# Patient Record
Sex: MALE | Race: WHITE | Employment: OTHER | URBAN - METROPOLITAN AREA
[De-identification: names, ages, dates, MRNs, and addresses within clinical notes are randomized per-mention and may not be internally consistent; named-entity substitution may affect disease eponyms.]

---

## 2020-01-21 PROBLEM — I82.4Z2 ACUTE DEEP VEIN THROMBOSIS (DVT) OF DISTAL VEIN OF LEFT LOWER EXTREMITY (HCC): Status: ACTIVE | Noted: 2020-01-21

## 2020-01-21 PROBLEM — D75.82 HIT (HEPARIN-INDUCED THROMBOCYTOPENIA) (HCC): Status: ACTIVE | Noted: 2020-01-21

## 2020-01-21 PROBLEM — D75.829 HIT (HEPARIN-INDUCED THROMBOCYTOPENIA) (HCC): Status: ACTIVE | Noted: 2020-01-21

## 2020-01-22 ENCOUNTER — LAB REQUISITION (OUTPATIENT)
Dept: LAB | Facility: HOSPITAL | Age: 70
End: 2020-01-22
Payer: MEDICARE

## 2020-01-22 DIAGNOSIS — R35.89 OTHER POLYURIA: ICD-10-CM

## 2020-01-22 PROCEDURE — 87088 URINE BACTERIA CULTURE: CPT | Performed by: FAMILY MEDICINE

## 2020-01-22 PROCEDURE — 87086 URINE CULTURE/COLONY COUNT: CPT | Performed by: FAMILY MEDICINE

## 2020-01-22 PROCEDURE — 87186 SC STD MICRODIL/AGAR DIL: CPT | Performed by: FAMILY MEDICINE

## 2020-02-13 PROBLEM — Z79.01 MONITORING FOR LONG-TERM ANTICOAGULANT USE: Status: ACTIVE | Noted: 2020-02-13

## 2020-02-13 PROBLEM — Z51.81 MONITORING FOR LONG-TERM ANTICOAGULANT USE: Status: ACTIVE | Noted: 2020-02-13

## 2020-02-20 ENCOUNTER — LAB REQUISITION (OUTPATIENT)
Dept: LAB | Facility: HOSPITAL | Age: 70
End: 2020-02-20
Payer: MEDICARE

## 2020-02-20 DIAGNOSIS — R35.1 NOCTURIA: ICD-10-CM

## 2020-02-20 LAB
ALBUMIN SERPL-MCNC: 3.3 G/DL (ref 3.4–5)
ALBUMIN/GLOB SERPL: 0.9 {RATIO} (ref 1–2)
ALP LIVER SERPL-CCNC: 65 U/L (ref 45–117)
ALT SERPL-CCNC: 18 U/L (ref 16–61)
ANION GAP SERPL CALC-SCNC: 8 MMOL/L (ref 0–18)
AST SERPL-CCNC: 14 U/L (ref 15–37)
BILIRUB SERPL-MCNC: 0.4 MG/DL (ref 0.1–2)
BILIRUB UR QL: NEGATIVE
BUN BLD-MCNC: 15 MG/DL (ref 7–18)
BUN/CREAT SERPL: 15.8 (ref 10–20)
CALCIUM BLD-MCNC: 8.9 MG/DL (ref 8.5–10.1)
CHLORIDE SERPL-SCNC: 109 MMOL/L (ref 98–112)
CO2 SERPL-SCNC: 25 MMOL/L (ref 21–32)
COLOR UR: YELLOW
COMPLEXED PSA SERPL-MCNC: 1.89 NG/ML (ref ?–4)
CREAT BLD-MCNC: 0.95 MG/DL (ref 0.7–1.3)
GLOBULIN PLAS-MCNC: 3.8 G/DL (ref 2.8–4.4)
GLUCOSE BLD-MCNC: 87 MG/DL (ref 70–99)
GLUCOSE UR-MCNC: NEGATIVE MG/DL
KETONES UR-MCNC: NEGATIVE MG/DL
M PROTEIN MFR SERPL ELPH: 7.1 G/DL (ref 6.4–8.2)
NITRITE UR QL STRIP.AUTO: NEGATIVE
OSMOLALITY SERPL CALC.SUM OF ELEC: 294 MOSM/KG (ref 275–295)
PH UR: 5 [PH] (ref 5–8)
POTASSIUM SERPL-SCNC: 4.2 MMOL/L (ref 3.5–5.1)
PROT UR-MCNC: 30 MG/DL
RBC #/AREA URNS AUTO: 14 /HPF
SODIUM SERPL-SCNC: 142 MMOL/L (ref 136–145)
SP GR UR STRIP: 1.02 (ref 1–1.03)
UROBILINOGEN UR STRIP-ACNC: <2
WBC #/AREA URNS AUTO: 708 /HPF

## 2020-02-20 PROCEDURE — 87077 CULTURE AEROBIC IDENTIFY: CPT | Performed by: FAMILY MEDICINE

## 2020-02-20 PROCEDURE — 87086 URINE CULTURE/COLONY COUNT: CPT | Performed by: FAMILY MEDICINE

## 2020-02-20 PROCEDURE — 80053 COMPREHEN METABOLIC PANEL: CPT | Performed by: FAMILY MEDICINE

## 2020-02-20 PROCEDURE — 81001 URINALYSIS AUTO W/SCOPE: CPT | Performed by: FAMILY MEDICINE

## 2020-02-20 PROCEDURE — 87186 SC STD MICRODIL/AGAR DIL: CPT | Performed by: FAMILY MEDICINE

## 2020-03-12 ENCOUNTER — LAB REQUISITION (OUTPATIENT)
Dept: LAB | Facility: HOSPITAL | Age: 70
End: 2020-03-12
Payer: MEDICARE

## 2020-03-12 DIAGNOSIS — N39.0 URINARY TRACT INFECTION, SITE NOT SPECIFIED: ICD-10-CM

## 2020-03-12 LAB
BACTERIA UR QL AUTO: NEGATIVE /HPF
BILIRUB UR QL: NEGATIVE
COLOR UR: YELLOW
GLUCOSE UR-MCNC: NEGATIVE MG/DL
KETONES UR-MCNC: NEGATIVE MG/DL
NITRITE UR QL STRIP.AUTO: NEGATIVE
PH UR: 5 [PH] (ref 5–8)
PROT UR-MCNC: 30 MG/DL
RBC #/AREA URNS AUTO: 53 /HPF
SP GR UR STRIP: 1.01 (ref 1–1.03)
UROBILINOGEN UR STRIP-ACNC: <2
WBC #/AREA URNS AUTO: 750 /HPF

## 2020-03-12 PROCEDURE — 81001 URINALYSIS AUTO W/SCOPE: CPT | Performed by: FAMILY MEDICINE

## 2020-03-12 PROCEDURE — 87086 URINE CULTURE/COLONY COUNT: CPT | Performed by: FAMILY MEDICINE

## 2020-03-12 PROCEDURE — 87186 SC STD MICRODIL/AGAR DIL: CPT | Performed by: FAMILY MEDICINE

## 2020-03-12 PROCEDURE — 87088 URINE BACTERIA CULTURE: CPT | Performed by: FAMILY MEDICINE

## 2020-08-19 ENCOUNTER — LAB REQUISITION (OUTPATIENT)
Dept: LAB | Facility: HOSPITAL | Age: 70
End: 2020-08-19
Payer: MEDICARE

## 2020-08-19 DIAGNOSIS — C44.41 BASAL CELL CARCINOMA OF SKIN OF SCALP AND NECK: ICD-10-CM

## 2020-08-19 PROCEDURE — 88305 TISSUE EXAM BY PATHOLOGIST: CPT | Performed by: DERMATOLOGY

## 2020-08-27 PROBLEM — E78.00 HYPERCHOLESTEROLEMIA: Status: ACTIVE | Noted: 2020-08-27

## 2020-08-27 PROBLEM — I10 HYPERTENSION, ESSENTIAL: Status: ACTIVE | Noted: 2020-08-27

## 2020-09-14 ENCOUNTER — LAB ENCOUNTER (OUTPATIENT)
Dept: LAB | Age: 70
DRG: 253 | End: 2020-09-14
Attending: SURGERY
Payer: MEDICARE

## 2020-09-14 ENCOUNTER — APPOINTMENT (OUTPATIENT)
Dept: LAB | Age: 70
DRG: 253 | End: 2020-09-14
Attending: SURGERY
Payer: MEDICARE

## 2020-09-14 DIAGNOSIS — Z01.818 PRE-OP TESTING: ICD-10-CM

## 2020-09-14 LAB
ANTIBODY SCREEN: NEGATIVE
RH BLOOD TYPE: POSITIVE

## 2020-09-14 PROCEDURE — 86900 BLOOD TYPING SEROLOGIC ABO: CPT

## 2020-09-14 PROCEDURE — 87641 MR-STAPH DNA AMP PROBE: CPT

## 2020-09-14 PROCEDURE — 86850 RBC ANTIBODY SCREEN: CPT

## 2020-09-14 PROCEDURE — 36415 COLL VENOUS BLD VENIPUNCTURE: CPT

## 2020-09-14 PROCEDURE — 86901 BLOOD TYPING SEROLOGIC RH(D): CPT

## 2020-09-15 LAB
MRSA DNA SPEC QL NAA+PROBE: NEGATIVE
SARS-COV-2 RNA RESP QL NAA+PROBE: NOT DETECTED

## 2020-09-16 ENCOUNTER — ANESTHESIA (OUTPATIENT)
Dept: SURGERY | Facility: HOSPITAL | Age: 70
DRG: 253 | End: 2020-09-16
Payer: MEDICARE

## 2020-09-16 ENCOUNTER — ANESTHESIA EVENT (OUTPATIENT)
Dept: SURGERY | Facility: HOSPITAL | Age: 70
DRG: 253 | End: 2020-09-16
Payer: MEDICARE

## 2020-09-16 ENCOUNTER — HOSPITAL ENCOUNTER (INPATIENT)
Facility: HOSPITAL | Age: 70
LOS: 6 days | Discharge: HOME | DRG: 253 | End: 2020-09-22
Attending: SURGERY | Admitting: SURGERY
Payer: MEDICARE

## 2020-09-16 DIAGNOSIS — Z01.818 PRE-OP TESTING: Primary | ICD-10-CM

## 2020-09-16 PROBLEM — E27.40 ADRENAL INSUFFICIENCY (HCC): Status: ACTIVE | Noted: 2020-09-16

## 2020-09-16 PROBLEM — I72.4 POPLITEAL ARTERY ANEURYSM (HCC): Status: ACTIVE | Noted: 2020-09-16

## 2020-09-16 PROBLEM — R31.9 HEMATURIA: Status: ACTIVE | Noted: 2020-09-16

## 2020-09-16 LAB
BILIRUB UR QL: NEGATIVE
COLOR UR: YELLOW
GLUCOSE UR-MCNC: NEGATIVE MG/DL
INR BLD: 1.11 (ref 0.9–1.2)
ISTAT ACTIVATED CLOTTING TIME: 153 SECONDS (ref 125–137)
ISTAT ACTIVATED CLOTTING TIME: 521 SECONDS (ref 125–137)
ISTAT ACTIVATED CLOTTING TIME: <125 SECONDS (ref 125–137)
ISTAT ACTIVATED CLOTTING TIME: >675 SECONDS (ref 125–137)
KETONES UR-MCNC: NEGATIVE MG/DL
NITRITE UR QL STRIP.AUTO: NEGATIVE
PH UR: 5 [PH] (ref 5–8)
POCT HEMOCUE: 13.3 (ref 13.5–17.5)
PROT UR-MCNC: 30 MG/DL
PROTHROMBIN TIME: 14.1 SECONDS (ref 11.8–14.5)
RBC #/AREA URNS AUTO: 1416 /HPF
SP GR UR STRIP: 1.02 (ref 1–1.03)
UROBILINOGEN UR STRIP-ACNC: <2
WBC #/AREA URNS AUTO: 69 /HPF

## 2020-09-16 PROCEDURE — 041M0JL BYPASS RIGHT POPLITEAL ARTERY TO POPLITEAL ARTERY WITH SYNTHETIC SUBSTITUTE, OPEN APPROACH: ICD-10-PCS | Performed by: SURGERY

## 2020-09-16 PROCEDURE — 85610 PROTHROMBIN TIME: CPT | Performed by: SURGERY

## 2020-09-16 PROCEDURE — 85347 COAGULATION TIME ACTIVATED: CPT

## 2020-09-16 PROCEDURE — 87086 URINE CULTURE/COLONY COUNT: CPT | Performed by: NURSE PRACTITIONER

## 2020-09-16 PROCEDURE — 36415 COLL VENOUS BLD VENIPUNCTURE: CPT | Performed by: SURGERY

## 2020-09-16 PROCEDURE — 81001 URINALYSIS AUTO W/SCOPE: CPT | Performed by: NURSE PRACTITIONER

## 2020-09-16 RX ORDER — MORPHINE SULFATE 4 MG/ML
2 INJECTION, SOLUTION INTRAMUSCULAR; INTRAVENOUS EVERY 10 MIN PRN
Status: DISCONTINUED | OUTPATIENT
Start: 2020-09-16 | End: 2020-09-16 | Stop reason: HOSPADM

## 2020-09-16 RX ORDER — GABAPENTIN 300 MG/1
300 CAPSULE ORAL 3 TIMES DAILY
Status: DISCONTINUED | OUTPATIENT
Start: 2020-09-16 | End: 2020-09-22

## 2020-09-16 RX ORDER — FAMOTIDINE 20 MG/1
20 TABLET ORAL ONCE
Status: COMPLETED | OUTPATIENT
Start: 2020-09-16 | End: 2020-09-16

## 2020-09-16 RX ORDER — MORPHINE SULFATE 4 MG/ML
4 INJECTION, SOLUTION INTRAMUSCULAR; INTRAVENOUS EVERY 10 MIN PRN
Status: DISCONTINUED | OUTPATIENT
Start: 2020-09-16 | End: 2020-09-16 | Stop reason: HOSPADM

## 2020-09-16 RX ORDER — HYDROCODONE BITARTRATE AND ACETAMINOPHEN 5; 325 MG/1; MG/1
1 TABLET ORAL EVERY 6 HOURS PRN
Status: DISCONTINUED | OUTPATIENT
Start: 2020-09-16 | End: 2020-09-22

## 2020-09-16 RX ORDER — TRAMADOL HYDROCHLORIDE 50 MG/1
50 TABLET ORAL EVERY 6 HOURS PRN
Status: DISCONTINUED | OUTPATIENT
Start: 2020-09-16 | End: 2020-09-16

## 2020-09-16 RX ORDER — CEFAZOLIN SODIUM/WATER 2 G/20 ML
2 SYRINGE (ML) INTRAVENOUS ONCE
Status: COMPLETED | OUTPATIENT
Start: 2020-09-16 | End: 2020-09-16

## 2020-09-16 RX ORDER — DEXTROSE AND SODIUM CHLORIDE 5; .45 G/100ML; G/100ML
INJECTION, SOLUTION INTRAVENOUS CONTINUOUS
Status: DISCONTINUED | OUTPATIENT
Start: 2020-09-16 | End: 2020-09-17

## 2020-09-16 RX ORDER — MORPHINE SULFATE 10 MG/ML
6 INJECTION, SOLUTION INTRAMUSCULAR; INTRAVENOUS EVERY 10 MIN PRN
Status: DISCONTINUED | OUTPATIENT
Start: 2020-09-16 | End: 2020-09-16 | Stop reason: HOSPADM

## 2020-09-16 RX ORDER — METOPROLOL TARTRATE 5 MG/5ML
INJECTION INTRAVENOUS AS NEEDED
Status: DISCONTINUED | OUTPATIENT
Start: 2020-09-16 | End: 2020-09-16 | Stop reason: SURG

## 2020-09-16 RX ORDER — HYDROCODONE BITARTRATE AND ACETAMINOPHEN 5; 325 MG/1; MG/1
1 TABLET ORAL AS NEEDED
Status: DISCONTINUED | OUTPATIENT
Start: 2020-09-16 | End: 2020-09-16 | Stop reason: HOSPADM

## 2020-09-16 RX ORDER — NALOXONE HYDROCHLORIDE 0.4 MG/ML
80 INJECTION, SOLUTION INTRAMUSCULAR; INTRAVENOUS; SUBCUTANEOUS AS NEEDED
Status: DISCONTINUED | OUTPATIENT
Start: 2020-09-16 | End: 2020-09-16 | Stop reason: HOSPADM

## 2020-09-16 RX ORDER — CEFAZOLIN SODIUM/WATER 2 G/20 ML
2 SYRINGE (ML) INTRAVENOUS EVERY 8 HOURS
Status: COMPLETED | OUTPATIENT
Start: 2020-09-16 | End: 2020-09-17

## 2020-09-16 RX ORDER — ONDANSETRON 2 MG/ML
INJECTION INTRAMUSCULAR; INTRAVENOUS AS NEEDED
Status: DISCONTINUED | OUTPATIENT
Start: 2020-09-16 | End: 2020-09-16 | Stop reason: SURG

## 2020-09-16 RX ORDER — METOCLOPRAMIDE 10 MG/1
10 TABLET ORAL ONCE
Status: COMPLETED | OUTPATIENT
Start: 2020-09-16 | End: 2020-09-16

## 2020-09-16 RX ORDER — PHENYLEPHRINE HCL 10 MG/ML
VIAL (ML) INJECTION AS NEEDED
Status: DISCONTINUED | OUTPATIENT
Start: 2020-09-16 | End: 2020-09-16 | Stop reason: SURG

## 2020-09-16 RX ORDER — TAMSULOSIN HYDROCHLORIDE 0.4 MG/1
0.4 CAPSULE ORAL EVERY EVENING
Status: DISCONTINUED | OUTPATIENT
Start: 2020-09-16 | End: 2020-09-22

## 2020-09-16 RX ORDER — TRAMADOL HYDROCHLORIDE 50 MG/1
50 TABLET ORAL EVERY 6 HOURS PRN
Status: DISCONTINUED | OUTPATIENT
Start: 2020-09-16 | End: 2020-09-22

## 2020-09-16 RX ORDER — ACETAMINOPHEN 500 MG
1000 TABLET ORAL ONCE
Status: COMPLETED | OUTPATIENT
Start: 2020-09-16 | End: 2020-09-16

## 2020-09-16 RX ORDER — CLOPIDOGREL BISULFATE 75 MG/1
75 TABLET ORAL DAILY
Status: DISCONTINUED | OUTPATIENT
Start: 2020-09-16 | End: 2020-09-16

## 2020-09-16 RX ORDER — ONDANSETRON 2 MG/ML
4 INJECTION INTRAMUSCULAR; INTRAVENOUS EVERY 6 HOURS PRN
Status: DISCONTINUED | OUTPATIENT
Start: 2020-09-16 | End: 2020-09-22

## 2020-09-16 RX ORDER — ONDANSETRON 2 MG/ML
4 INJECTION INTRAMUSCULAR; INTRAVENOUS ONCE AS NEEDED
Status: DISCONTINUED | OUTPATIENT
Start: 2020-09-16 | End: 2020-09-16 | Stop reason: HOSPADM

## 2020-09-16 RX ORDER — HYDROMORPHONE HYDROCHLORIDE 1 MG/ML
0.6 INJECTION, SOLUTION INTRAMUSCULAR; INTRAVENOUS; SUBCUTANEOUS EVERY 5 MIN PRN
Status: DISCONTINUED | OUTPATIENT
Start: 2020-09-16 | End: 2020-09-16 | Stop reason: HOSPADM

## 2020-09-16 RX ORDER — LIDOCAINE HYDROCHLORIDE 10 MG/ML
INJECTION, SOLUTION EPIDURAL; INFILTRATION; INTRACAUDAL; PERINEURAL AS NEEDED
Status: DISCONTINUED | OUTPATIENT
Start: 2020-09-16 | End: 2020-09-16 | Stop reason: SURG

## 2020-09-16 RX ORDER — ACETAMINOPHEN 325 MG/1
650 TABLET ORAL EVERY 6 HOURS PRN
Status: DISCONTINUED | OUTPATIENT
Start: 2020-09-16 | End: 2020-09-22

## 2020-09-16 RX ORDER — ESMOLOL HYDROCHLORIDE 10 MG/ML
INJECTION INTRAVENOUS AS NEEDED
Status: DISCONTINUED | OUTPATIENT
Start: 2020-09-16 | End: 2020-09-16 | Stop reason: SURG

## 2020-09-16 RX ORDER — GLYCOPYRROLATE 0.2 MG/ML
INJECTION, SOLUTION INTRAMUSCULAR; INTRAVENOUS AS NEEDED
Status: DISCONTINUED | OUTPATIENT
Start: 2020-09-16 | End: 2020-09-16 | Stop reason: SURG

## 2020-09-16 RX ORDER — ATORVASTATIN CALCIUM 20 MG/1
20 TABLET, FILM COATED ORAL DAILY
Status: DISCONTINUED | OUTPATIENT
Start: 2020-09-17 | End: 2020-09-22

## 2020-09-16 RX ORDER — HYDROMORPHONE HYDROCHLORIDE 1 MG/ML
0.2 INJECTION, SOLUTION INTRAMUSCULAR; INTRAVENOUS; SUBCUTANEOUS EVERY 5 MIN PRN
Status: DISCONTINUED | OUTPATIENT
Start: 2020-09-16 | End: 2020-09-16 | Stop reason: HOSPADM

## 2020-09-16 RX ORDER — SODIUM CHLORIDE 9 MG/ML
INJECTION, SOLUTION INTRAVENOUS CONTINUOUS PRN
Status: DISCONTINUED | OUTPATIENT
Start: 2020-09-16 | End: 2020-09-16 | Stop reason: SURG

## 2020-09-16 RX ORDER — NEOSTIGMINE METHYLSULFATE 1 MG/ML
INJECTION INTRAVENOUS AS NEEDED
Status: DISCONTINUED | OUTPATIENT
Start: 2020-09-16 | End: 2020-09-16 | Stop reason: SURG

## 2020-09-16 RX ORDER — SODIUM CHLORIDE, SODIUM LACTATE, POTASSIUM CHLORIDE, CALCIUM CHLORIDE 600; 310; 30; 20 MG/100ML; MG/100ML; MG/100ML; MG/100ML
INJECTION, SOLUTION INTRAVENOUS CONTINUOUS
Status: DISCONTINUED | OUTPATIENT
Start: 2020-09-16 | End: 2020-09-16 | Stop reason: HOSPADM

## 2020-09-16 RX ORDER — CLOPIDOGREL BISULFATE 75 MG/1
75 TABLET ORAL EVERY EVENING
Status: DISCONTINUED | OUTPATIENT
Start: 2020-09-17 | End: 2020-09-22

## 2020-09-16 RX ORDER — ROCURONIUM BROMIDE 10 MG/ML
INJECTION, SOLUTION INTRAVENOUS AS NEEDED
Status: DISCONTINUED | OUTPATIENT
Start: 2020-09-16 | End: 2020-09-16 | Stop reason: SURG

## 2020-09-16 RX ORDER — HYDROCODONE BITARTRATE AND ACETAMINOPHEN 5; 325 MG/1; MG/1
2 TABLET ORAL AS NEEDED
Status: DISCONTINUED | OUTPATIENT
Start: 2020-09-16 | End: 2020-09-16 | Stop reason: HOSPADM

## 2020-09-16 RX ORDER — SODIUM CHLORIDE, SODIUM LACTATE, POTASSIUM CHLORIDE, CALCIUM CHLORIDE 600; 310; 30; 20 MG/100ML; MG/100ML; MG/100ML; MG/100ML
INJECTION, SOLUTION INTRAVENOUS CONTINUOUS
Status: DISCONTINUED | OUTPATIENT
Start: 2020-09-16 | End: 2020-09-17

## 2020-09-16 RX ORDER — METHYLPREDNISOLONE SODIUM SUCCINATE 125 MG/2ML
50 INJECTION, POWDER, LYOPHILIZED, FOR SOLUTION INTRAMUSCULAR; INTRAVENOUS EVERY 8 HOURS
Status: DISCONTINUED | OUTPATIENT
Start: 2020-09-16 | End: 2020-09-16

## 2020-09-16 RX ORDER — HYDROMORPHONE HYDROCHLORIDE 1 MG/ML
0.4 INJECTION, SOLUTION INTRAMUSCULAR; INTRAVENOUS; SUBCUTANEOUS EVERY 5 MIN PRN
Status: DISCONTINUED | OUTPATIENT
Start: 2020-09-16 | End: 2020-09-16 | Stop reason: HOSPADM

## 2020-09-16 RX ADMIN — SODIUM CHLORIDE: 9 INJECTION, SOLUTION INTRAVENOUS at 14:00:00

## 2020-09-16 RX ADMIN — SODIUM CHLORIDE: 9 INJECTION, SOLUTION INTRAVENOUS at 12:26:00

## 2020-09-16 RX ADMIN — CEFAZOLIN SODIUM/WATER 2 G: 2 G/20 ML SYRINGE (ML) INTRAVENOUS at 10:11:00

## 2020-09-16 RX ADMIN — GLYCOPYRROLATE 0.8 MG: 0.2 INJECTION, SOLUTION INTRAMUSCULAR; INTRAVENOUS at 13:42:00

## 2020-09-16 RX ADMIN — ROCURONIUM BROMIDE 20 MG: 10 INJECTION, SOLUTION INTRAVENOUS at 10:49:00

## 2020-09-16 RX ADMIN — SODIUM CHLORIDE: 9 INJECTION, SOLUTION INTRAVENOUS at 13:43:00

## 2020-09-16 RX ADMIN — CEFAZOLIN SODIUM/WATER 2 G: 2 G/20 ML SYRINGE (ML) INTRAVENOUS at 10:22:00

## 2020-09-16 RX ADMIN — SODIUM CHLORIDE: 9 INJECTION, SOLUTION INTRAVENOUS at 11:20:00

## 2020-09-16 RX ADMIN — METOPROLOL TARTRATE 1 MG: 5 INJECTION INTRAVENOUS at 13:30:00

## 2020-09-16 RX ADMIN — PHENYLEPHRINE HCL 100 MCG: 10 MG/ML VIAL (ML) INJECTION at 10:41:00

## 2020-09-16 RX ADMIN — SODIUM CHLORIDE, SODIUM LACTATE, POTASSIUM CHLORIDE, CALCIUM CHLORIDE: 600; 310; 30; 20 INJECTION, SOLUTION INTRAVENOUS at 11:58:00

## 2020-09-16 RX ADMIN — ROCURONIUM BROMIDE 10 MG: 10 INJECTION, SOLUTION INTRAVENOUS at 11:49:00

## 2020-09-16 RX ADMIN — ESMOLOL HYDROCHLORIDE 20 MG: 10 INJECTION INTRAVENOUS at 10:37:00

## 2020-09-16 RX ADMIN — PHENYLEPHRINE HCL 100 MCG: 10 MG/ML VIAL (ML) INJECTION at 12:57:00

## 2020-09-16 RX ADMIN — SODIUM CHLORIDE, SODIUM LACTATE, POTASSIUM CHLORIDE, CALCIUM CHLORIDE: 600; 310; 30; 20 INJECTION, SOLUTION INTRAVENOUS at 11:20:00

## 2020-09-16 RX ADMIN — METOPROLOL TARTRATE 1 MG: 5 INJECTION INTRAVENOUS at 13:46:00

## 2020-09-16 RX ADMIN — SODIUM CHLORIDE: 9 INJECTION, SOLUTION INTRAVENOUS at 13:44:00

## 2020-09-16 RX ADMIN — SODIUM CHLORIDE: 9 INJECTION, SOLUTION INTRAVENOUS at 12:50:00

## 2020-09-16 RX ADMIN — ROCURONIUM BROMIDE 50 MG: 10 INJECTION, SOLUTION INTRAVENOUS at 10:00:00

## 2020-09-16 RX ADMIN — PHENYLEPHRINE HCL 100 MCG: 10 MG/ML VIAL (ML) INJECTION at 12:41:00

## 2020-09-16 RX ADMIN — SODIUM CHLORIDE: 9 INJECTION, SOLUTION INTRAVENOUS at 13:31:00

## 2020-09-16 RX ADMIN — ONDANSETRON 4 MG: 2 INJECTION INTRAMUSCULAR; INTRAVENOUS at 13:44:00

## 2020-09-16 RX ADMIN — LIDOCAINE HYDROCHLORIDE 50 MG: 10 INJECTION, SOLUTION EPIDURAL; INFILTRATION; INTRACAUDAL; PERINEURAL at 09:59:00

## 2020-09-16 RX ADMIN — ESMOLOL HYDROCHLORIDE 10 MG: 10 INJECTION INTRAVENOUS at 11:27:00

## 2020-09-16 RX ADMIN — NEOSTIGMINE METHYLSULFATE 5 MG: 1 INJECTION INTRAVENOUS at 13:42:00

## 2020-09-16 RX ADMIN — SODIUM CHLORIDE: 9 INJECTION, SOLUTION INTRAVENOUS at 12:25:00

## 2020-09-16 RX ADMIN — ROCURONIUM BROMIDE 10 MG: 10 INJECTION, SOLUTION INTRAVENOUS at 12:19:00

## 2020-09-16 RX ADMIN — SODIUM CHLORIDE: 9 INJECTION, SOLUTION INTRAVENOUS at 10:07:00

## 2020-09-16 RX ADMIN — ESMOLOL HYDROCHLORIDE 20 MG: 10 INJECTION INTRAVENOUS at 12:38:00

## 2020-09-16 RX ADMIN — ESMOLOL HYDROCHLORIDE 20 MG: 10 INJECTION INTRAVENOUS at 10:09:00

## 2020-09-16 NOTE — H&P
[facility] Hospitalist Team  History and Physical  Admit Date:  9/16/20    ASSESSMENT / PLAN:   70 yo male with hx basal cell ca, BPH, HTN, HL, abnl EKG, PAD dry gangrene S/P auto amputation, acute left popliteal artery thrombosis S/P Left SFA-peroneal artery byp plan as detailed above.  Discussed plan of care with Dr. Arpit Alfaro RN, NP  [address] [facility] Team  Pager [phone]  Answering Service number: [phone]  9/16/2020      HISTORY:   CC: Post Op Medical Management     PCP: Medications:  hydrocortisone 10 MG Oral Tab, Take 1 tablet (10 mg total) by mouth nightly., Disp: 90 tablet, Rfl: 0  hydrocortisone 20 MG Oral Tab, Take 1 tablet (20 mg total) by mouth every morning., Disp: 90 tablet, Rfl: 0  Fludrocortisone Acetate 0.1 MG assessed independently. Agree with above APN assessment.      Mr. Karsten Montemayor is a 70 yo male with hx basal cell ca, BPH, HTN, HL, abnl EKG, PAD dry gangrene S/P auto amputation, acute left popliteal artery thrombosis S/P Left SFA-peroneal artery bypass and surgery  - urology consulted, continue hourly dwyer flushing    Rest as above.     Marilyn Dick MD  [facility]

## 2020-09-16 NOTE — CONSULTS
[facility]    Report of Endocrinology Consultation  [facility]    Racquel Hogan Patient Status:  Inpatient    10/2/1950 MRN [id number]   Location [facility] 2W/SW Attending Laly Leyva MD   [facility] Day # 0 reports that he has quit smoking. He has never used smokeless tobacco. He reports that he does not drink alcohol or use drugs.     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 Cooperative. No apparent distress. HEENT: SALEEM, EOMI, thyroid non-enlarged  Neck: Supple. Lungs: Clear bilaterally. Cardiac: Regular rate and rhythm. Abdomen: Bowel sounds present, normoactive. Nontender.   Extremities: Extensive sca

## 2020-09-16 NOTE — H&P
Rigoberto Lau MD.   [facility]  Vascular and Endovascular Surgery  [address] [facility]                    VASCULAR SURGERY   CLINIC CONSULT NOTE           Name: Bebeto Deleon   :   10/2/1950  [id number]      REFERRING PHYSICIAN:  Araceli Michelle hydrocortisone 20 MG Oral Tab, Take 1 tablet (20 mg total) by mouth every morning., Disp: 90 tablet, Rfl: 0  •  Fludrocortisone Acetate 0.1 MG Oral Tab, Take 0.5 tablets (0.05 mg total) by mouth daily. , Disp: 45 tablet, Rfl: 3  •  tamsulosin (FLOMAX) cap, this visit:     Popliteal artery aneurysm (HCC)        The patient ha a 3 cm right popliteal aneurysm. I have recommended repair via a posterior approach. I have recommended expediting this repair given his history of occlusion on the opposite side.   The

## 2020-09-16 NOTE — ANESTHESIA PROCEDURE NOTES
Peripheral IV  Date/Time: 9/16/2020 10:07 AM  Inserted by: Ammon Vargas MD    Placement  Needle size: 18 G  Laterality: left  Location: hand  Site prep: alcohol  Technique: anatomical landmarks  Attempts: 1

## 2020-09-16 NOTE — CONSULTS
[facility]OSP - [facility]    Report of Consultation    Jeni Hogan Patient Status:  Inpatient    10/2/1950 MRN [id number]   Location [facility] 2W/SW Attending Duane Pereyra, MD   Hosp Day # 0 PCP PHYSICIAN NONSTAFF     Date of Admissi Medications:  lactated ringers infusion, , Intravenous, Continuous  [START ON 9/17/2020] atorvastatin (LIPITOR) tab 20 mg, 20 mg, Oral, Daily  [START ON 9/17/2020] Clopidogrel Bisulfate (PLAVIX) tab 75 mg, 75 mg, Oral, QPM  gabapentin (NEURONTIN) cap 300 m m), weight 263 lb (119.3 kg), SpO2 100 %.     GENERAL APPEARANCE: a well-developed, well-nourished, in no apparent distress  A&Ox3  HEENT normocephalic, no icterus  NECK supple, No supraclavicular masses  LUNGS breathing comfortably, not dyspneic, no use of

## 2020-09-16 NOTE — BRIEF OP NOTE
[facility]      PATIENTS NAME: Malena Young  ATTENDING PHYSICIAN: Letty Hoffmann MD  OPERATING PHYSICIAN: Dyllan Alves MD  CSN: [id number]                                                                LOCATION:  OR  MRN: [id number]

## 2020-09-16 NOTE — ANESTHESIA PREPROCEDURE EVALUATION
Anesthesia PreOp Note    HPI:     Babs Granger is a 71year old male who presents for preoperative consultation requested by: Masha Morel MD    Date of Surgery: 9/16/2020    Procedure(s):   FEMORAL POPLITEAL BYPASS  Indication: popliteal artery Sodium 5 MG Oral Tab, Take 1 tablet (5 mg total) by mouth nightly., Disp: 30 tablet, Rfl: 11, 9/11/2020  atorvastatin 20 MG Oral Tab, 20 mg every morning.  , Disp: , Rfl: , 9/15/2020 at 1930  Clopidogrel Bisulfate 75 MG Oral Tab, Take 75 mg by mouth every Never      Sexual activity: Not on file    Lifestyle      Physical activity:        Days per week: Not on file        Minutes per session: Not on file      Stress: Not on file    Relationships      Social connections:        Talks on phone: Not on file anesthetic complications   Airway   Mallampati: II  TM distance: >3 FB  Neck ROM: full  Dental      Comment: Poor dentition, very loose teeth      Pulmonary - normal exam     ROS comment: + history of smoking x 25 years, quit 1993  No current wheezing, not

## 2020-09-16 NOTE — ANESTHESIA PROCEDURE NOTES
Arterial Line  Performed by: Valentín Elizondo MD  Authorized by: Valentín Elizondo MD     General Information and Staff    Procedure Start:  9/16/2020 10:03 AM  Procedure End:  9/16/2020 10:05 AM  Anesthesiologist:  Valentín Elizondo MD  Performed By:

## 2020-09-16 NOTE — ANESTHESIA POSTPROCEDURE EVALUATION
Patient: Rodrick Horta    Procedure Summary     Date:  09/16/20 Room / Location:  [address] [place] [address] MAIN OR 17 / [address] [place] [address] MAIN OR    Anesthesia Start:  3045 Anesthesia Stop:  3230    Procedure:  FEMORAL POPLITEAL BYPASS (Right ) Diagnosis:  (popliteal artery aneurysm)

## 2020-09-16 NOTE — OPERATIVE REPORT
[facility]     PATIENTS NAME: Malena Young  ATTENDING PHYSICIAN: Letty Hoffmann MD  OPERATING PHYSICIAN: Dyllan Alves MD  CSN: [id number]     LOCATION:  OR  MRN: [id number]    YOB: 1950  ADMISSION DATE: 9/16/2020  OPERATION incision was made across the posterior crease of the knee joint, with its superior extent beginning medially. The incision was carried anteriorly through the subcutaneous tissue and superficial fascia to enter the popliteal fossa.  Exposure is maximized by suture line, back-bleeding, forward-flushing, and irrigation of the anastomosis with heparinized solution was performed. The anastomosis was then completed and checked for hemostasis, which was adequate.  The graft was then cut to the appropriate length and

## 2020-09-16 NOTE — ANESTHESIA PROCEDURE NOTES
Airway  Date/Time: 9/16/2020 10:01 AM  Urgency: elective      General Information and Staff    Patient location during procedure: OR  Anesthesiologist: Indira Kraus MD  Performed: anesthesiologist     Indications and Patient Condition  Indications for

## 2020-09-17 LAB
ANION GAP SERPL CALC-SCNC: 9 MMOL/L (ref 0–18)
BASOPHILS # BLD AUTO: 0.03 X10(3) UL (ref 0–0.2)
BASOPHILS NFR BLD AUTO: 0.2 %
BUN BLD-MCNC: 14 MG/DL (ref 7–18)
BUN/CREAT SERPL: 11.3 (ref 10–20)
CALCIUM BLD-MCNC: 8.7 MG/DL (ref 8.5–10.1)
CHLORIDE SERPL-SCNC: 112 MMOL/L (ref 98–112)
CO2 SERPL-SCNC: 20 MMOL/L (ref 21–32)
CREAT BLD-MCNC: 1.24 MG/DL (ref 0.7–1.3)
DEPRECATED RDW RBC AUTO: 51.6 FL (ref 35.1–46.3)
EOSINOPHIL # BLD AUTO: 0 X10(3) UL (ref 0–0.7)
EOSINOPHIL NFR BLD AUTO: 0 %
ERYTHROCYTE [DISTWIDTH] IN BLOOD BY AUTOMATED COUNT: 15.2 % (ref 11–15)
EST. AVERAGE GLUCOSE BLD GHB EST-MCNC: 117 MG/DL (ref 68–126)
GLUCOSE BLD-MCNC: 196 MG/DL (ref 70–99)
HBA1C MFR BLD HPLC: 5.7 % (ref ?–5.7)
HCT VFR BLD AUTO: 32.2 % (ref 39–53)
HGB BLD-MCNC: 10.6 G/DL (ref 13–17.5)
IMM GRANULOCYTES # BLD AUTO: 0.11 X10(3) UL (ref 0–1)
IMM GRANULOCYTES NFR BLD: 0.6 %
INR BLD: 1.29 (ref 0.9–1.2)
LYMPHOCYTES # BLD AUTO: 0.89 X10(3) UL (ref 1–4)
LYMPHOCYTES NFR BLD AUTO: 4.8 %
MCH RBC QN AUTO: 30.5 PG (ref 26–34)
MCHC RBC AUTO-ENTMCNC: 32.9 G/DL (ref 31–37)
MCV RBC AUTO: 92.8 FL (ref 80–100)
MONOCYTES # BLD AUTO: 1.11 X10(3) UL (ref 0.1–1)
MONOCYTES NFR BLD AUTO: 6 %
NEUTROPHILS # BLD AUTO: 16.31 X10 (3) UL (ref 1.5–7.7)
NEUTROPHILS # BLD AUTO: 16.31 X10(3) UL (ref 1.5–7.7)
NEUTROPHILS NFR BLD AUTO: 88.4 %
OSMOLALITY SERPL CALC.SUM OF ELEC: 298 MOSM/KG (ref 275–295)
PLATELET # BLD AUTO: 173 10(3)UL (ref 150–450)
POTASSIUM SERPL-SCNC: 4 MMOL/L (ref 3.5–5.1)
PROTHROMBIN TIME: 15.9 SECONDS (ref 11.8–14.5)
RBC # BLD AUTO: 3.47 X10(6)UL (ref 3.8–5.8)
SODIUM SERPL-SCNC: 141 MMOL/L (ref 136–145)
WBC # BLD AUTO: 18.5 X10(3) UL (ref 4–11)

## 2020-09-17 PROCEDURE — 83036 HEMOGLOBIN GLYCOSYLATED A1C: CPT | Performed by: NURSE PRACTITIONER

## 2020-09-17 PROCEDURE — 85610 PROTHROMBIN TIME: CPT | Performed by: SURGERY

## 2020-09-17 PROCEDURE — 80048 BASIC METABOLIC PNL TOTAL CA: CPT | Performed by: SURGERY

## 2020-09-17 PROCEDURE — 85025 COMPLETE CBC W/AUTO DIFF WBC: CPT | Performed by: SURGERY

## 2020-09-17 RX ORDER — FLUDROCORTISONE ACETATE 0.1 MG/1
0.05 TABLET ORAL DAILY
Status: DISCONTINUED | OUTPATIENT
Start: 2020-09-17 | End: 2020-09-22

## 2020-09-17 RX ORDER — WARFARIN SODIUM 5 MG/1
5 TABLET ORAL NIGHTLY
Status: DISCONTINUED | OUTPATIENT
Start: 2020-09-17 | End: 2020-09-18

## 2020-09-17 NOTE — PROGRESS NOTES
[facility] Hospitalist Team  Progress Note    Ricco Mily Hogan Patient Status:  Inpatient    10/2/1950 MRN [id number]   Location [facility] 2W/SW Attending Dimitri Avilez MD   Hosp Day # 1 PCP PHYSICIAN NONSTAFF     CC: Follow Up  PCP: PHYSICIAN NO Marimar Brown [phone]     GARRICK  -lybriseida in am  -IVF, stop   -diet-general      Prophy  -SCD  -resume warfarin when ok with vascular surgery      Dispo  -transfer to tele if ok with vascular surgery   PCP: PHYSICIAN NONSTAFF        Concerns regarding pl TID  tamsulosin HCl (FLOMAX) cap 0.4 mg, 0.4 mg, Oral, QPM  ondansetron HCl (ZOFRAN) injection 4 mg, 4 mg, Intravenous, Q6H PRN  HYDROcodone-acetaminophen (NORCO) 5-325 MG per tab 1 tablet, 1 tablet, Oral, Q6H PRN  acetaminophen (TYLENOL) tab 650 mg, 650 m mg Q8hr started 9/16 PM, taper per endo  - start plavix today   - will d/w vascular surgery regarding when to restart coumadin, +/- arixtra  - transfer out of ICU when ok with surgery     Rest as above.  Jeaneth Welsh MD  [facility] hospitalist

## 2020-09-17 NOTE — PROGRESS NOTES
[facility]    Endocrine Progress Note  [facility]    [facility] Patient Status:  Inpatient    10/2/1950 MRN [id number]   Location [facility] 2W/SW Attending Marciano Mendoza MD   Hosp Day # 1 PCP PHYSICIAN N oriented X 3    Results:     Lab Results   Component Value Date    WBC 18.5 (H) 09/17/2020    HGB 10.6 (L) 09/17/2020    HCT 32.2 (L) 09/17/2020    .0 09/17/2020    CREATSERUM 1.24 09/17/2020    BUN 14 09/17/2020     09/17/2020    K 4.0 09/17/

## 2020-09-17 NOTE — PLAN OF CARE
Patient to transfer from 216 to 330. Report given to [facility], RN. Medications, labs, plan of care reviewed. All belongings with patient. Nursing provided patient with current list of medications; reviewed purpose and side effects of medications.  No further que

## 2020-09-18 LAB
ANION GAP SERPL CALC-SCNC: 6 MMOL/L (ref 0–18)
BASOPHILS # BLD AUTO: 0.06 X10(3) UL (ref 0–0.2)
BASOPHILS NFR BLD AUTO: 0.4 %
BUN BLD-MCNC: 20 MG/DL (ref 7–18)
BUN/CREAT SERPL: 20.6 (ref 10–20)
CALCIUM BLD-MCNC: 8.6 MG/DL (ref 8.5–10.1)
CHLORIDE SERPL-SCNC: 112 MMOL/L (ref 98–112)
CO2 SERPL-SCNC: 26 MMOL/L (ref 21–32)
CREAT BLD-MCNC: 0.97 MG/DL (ref 0.7–1.3)
DEPRECATED RDW RBC AUTO: 52.7 FL (ref 35.1–46.3)
EOSINOPHIL # BLD AUTO: 0.08 X10(3) UL (ref 0–0.7)
EOSINOPHIL NFR BLD AUTO: 0.5 %
ERYTHROCYTE [DISTWIDTH] IN BLOOD BY AUTOMATED COUNT: 15.6 % (ref 11–15)
GLUCOSE BLD-MCNC: 114 MG/DL (ref 70–99)
HCT VFR BLD AUTO: 29.2 % (ref 39–53)
HGB BLD-MCNC: 9.5 G/DL (ref 13–17.5)
IMM GRANULOCYTES # BLD AUTO: 0.11 X10(3) UL (ref 0–1)
IMM GRANULOCYTES NFR BLD: 0.7 %
INR BLD: 1.19 (ref 0.9–1.2)
LYMPHOCYTES # BLD AUTO: 2.25 X10(3) UL (ref 1–4)
LYMPHOCYTES NFR BLD AUTO: 13.8 %
MCH RBC QN AUTO: 30 PG (ref 26–34)
MCHC RBC AUTO-ENTMCNC: 32.5 G/DL (ref 31–37)
MCV RBC AUTO: 92.1 FL (ref 80–100)
MONOCYTES # BLD AUTO: 1.31 X10(3) UL (ref 0.1–1)
MONOCYTES NFR BLD AUTO: 8.1 %
NEUTROPHILS # BLD AUTO: 12.46 X10 (3) UL (ref 1.5–7.7)
NEUTROPHILS # BLD AUTO: 12.46 X10(3) UL (ref 1.5–7.7)
NEUTROPHILS NFR BLD AUTO: 76.5 %
OSMOLALITY SERPL CALC.SUM OF ELEC: 301 MOSM/KG (ref 275–295)
PLATELET # BLD AUTO: 154 10(3)UL (ref 150–450)
POTASSIUM SERPL-SCNC: 4 MMOL/L (ref 3.5–5.1)
PROTHROMBIN TIME: 14.9 SECONDS (ref 11.8–14.5)
RBC # BLD AUTO: 3.17 X10(6)UL (ref 3.8–5.8)
SODIUM SERPL-SCNC: 144 MMOL/L (ref 136–145)
WBC # BLD AUTO: 16.3 X10(3) UL (ref 4–11)

## 2020-09-18 PROCEDURE — 85025 COMPLETE CBC W/AUTO DIFF WBC: CPT | Performed by: NURSE PRACTITIONER

## 2020-09-18 PROCEDURE — 97161 PT EVAL LOW COMPLEX 20 MIN: CPT

## 2020-09-18 PROCEDURE — 97116 GAIT TRAINING THERAPY: CPT

## 2020-09-18 PROCEDURE — 85610 PROTHROMBIN TIME: CPT | Performed by: SURGERY

## 2020-09-18 PROCEDURE — 80048 BASIC METABOLIC PNL TOTAL CA: CPT | Performed by: NURSE PRACTITIONER

## 2020-09-18 RX ORDER — WARFARIN SODIUM 5 MG/1
5 TABLET ORAL
Status: DISCONTINUED | OUTPATIENT
Start: 2020-09-21 | End: 2020-09-20

## 2020-09-18 RX ORDER — LORAZEPAM 1 MG/1
1 TABLET ORAL NIGHTLY
Status: DISCONTINUED | OUTPATIENT
Start: 2020-09-18 | End: 2020-09-22

## 2020-09-18 NOTE — PHYSICAL THERAPY NOTE
PHYSICAL THERAPY EVALUATION - INPATIENT     Room Number: 330/330-A  Evaluation Date: 9/18/2020  Type of Evaluation: Initial   Physician Order: PT Eval and Treat    Presenting Problem: R popliteal artery aneursym repair by Dr. Ramez Dean 9/16  Reason for [facility](Intermittent supervision)    PLAN  PT Treatment Plan: Bed mobility; Endurance; Patient education;Gait training;Strengthening;Transfer training;Balance training  Rehab Potential : Good  Frequency (Obs): Daily       PHYSICAL THERAPY MEDICAL/SOCIAL HISTORY limits    RANGE OF MOTION AND STRENGTH ASSESSMENT  Upper extremity ROM and strength are within functional limits  Lower extremity ROM is within functional limits  Lower extremity strength is within functional limits    BALANCE  Static Sitting: Good  Sugar Sit to/from Stand at assistance level: modified independent with walker - rolling     Goal #2  Current Status    Goal #3 Patient is able to ambulate 150 feet with assist device: walker - rolling at assistance level: modified independent   Goal #3   Current

## 2020-09-18 NOTE — PLAN OF CARE
Afebrile. Surgical incision clean dry and intact. CHEL drain intact and draining bloody drainage. Continue to monitor.    Problem: RISK FOR INFECTION - ADULT  Goal: Absence of fever/infection during anticipated neutropenic period  Description  INTERVENTIONS

## 2020-09-18 NOTE — PROGRESS NOTES
[facility] Hospitalist Team  Progress Note    Daysi Hogan Patient Status:  Inpatient    10/2/1950 MRN [id number]   Location [facility] 3W/SW Attending Janneth Penaloza MD   Hosp Day # 2 PCP PHYSICIAN NONSTAFF     CC: Follow Up  PCP: PHYSICIAN NO am  -diet-general      Prophy  -SCD  -argatroban      Dispo  -? Possible d/C tomorrow if pain controlled and moving safely  PCP: PHYSICIAN NONSTAFF        Concerns regarding plan of care were discussed with patient.  Patient agrees with plan as detailed abo injection 30 mg, 30 mg, Intravenous, Q8H  Fludrocortisone Acetate (FLORINEF) tab 0.05 mg, 0.05 mg, Oral, Daily  atorvastatin (LIPITOR) tab 20 mg, 20 mg, Oral, Daily  Clopidogrel Bisulfate (PLAVIX) tab 75 mg, 75 mg, Oral, QPM  gabapentin (NEURONTIN) cap 300 HIT  Right Leg DVT   Left Leg Arterial Thrombosis Left SFA-peroneal artery bypass and thrombectomy of AT and P  Hx HIT  BPH  HL  GOC     Labs:  - WBC 16.3 from 18.5 yesterday  - Hg 9.5 from 10.6 yesterday and 14.4 pre-op  - Cr 0.97  - INR 1.19     Plan:  -

## 2020-09-18 NOTE — PROGRESS NOTES
[facility]  Vascular Surgery Progress Note    [facility] Patient Status:  Inpatient    10/2/1950 MRN [id number]   Location [facility] 3W/SW Attending Yanick Mccoy MD   Hosp Day # 1 PCP PHYSICIAN NONSTAFF     Objective:   Temp: Lab 09/17/20  0518      K 4.0      CO2 20.0*   BUN 14   CREATSERUM 1.24   *   CA 8.7     Recent Labs   Lab 09/16/20  0728 09/17/20  1434   PTP 14.1 15.9*   INR 1.11 1.29*     No results for input(s): ALT, AST, ALB, AMYLASE, LIPASE, LDH

## 2020-09-18 NOTE — CM/SW NOTE
Per chart review, pt is listed as uninsured. SW contacted [address] ([id number]) and spoke to [facility]. He confirmed he ran pt's insurance - pt has Medicare A&B,D and Medicaid.     Per RN rounds, pt most likely has no needs at time of d/c.    PATRICIO/ALEC to rem

## 2020-09-18 NOTE — PROGRESS NOTES
[facility]    Endocrine Progress Note  [facility]    [facility] Patient Status:  Inpatient    10/2/1950 MRN [id number]   Location [facility] 3W/SW Attending Thai Srinivasan MD   Hosp Day # 2 PCP PHYSICIAN N 1.6 oz (118 kg), SpO2 96 %.     Physical Exam:  General appearance: alert, appears stated age and cooperative  Pulmonary:  Non-labored respirations --- up in chair  Cardiovascular: S1, S2 normal, no murmur, click, rub or gallop, regular rate and rhythm, no

## 2020-09-18 NOTE — PLAN OF CARE
Pt alert and oriented. Denies leg pain. Lying in bed resting. Continuing Solu Cortef, Dose of coumadin given. No acute events overnight. Will continue to monitor.    Problem: Patient Centered Care  Goal: Patient preferences are identified and integrated in fever/infection during anticipated neutropenic period  Description  INTERVENTIONS  - Monitor WBC  - Administer growth factors as ordered  - Implement neutropenic guidelines  Outcome: Progressing     Problem: SAFETY ADULT - FALL  Goal: Free from fall injury

## 2020-09-19 LAB
ANION GAP SERPL CALC-SCNC: 5 MMOL/L (ref 0–18)
BASOPHILS # BLD AUTO: 0.05 X10(3) UL (ref 0–0.2)
BASOPHILS NFR BLD AUTO: 0.3 %
BUN BLD-MCNC: 16 MG/DL (ref 7–18)
BUN/CREAT SERPL: 16.3 (ref 10–20)
CALCIUM BLD-MCNC: 8.5 MG/DL (ref 8.5–10.1)
CHLORIDE SERPL-SCNC: 112 MMOL/L (ref 98–112)
CO2 SERPL-SCNC: 25 MMOL/L (ref 21–32)
CREAT BLD-MCNC: 0.98 MG/DL
DEPRECATED RDW RBC AUTO: 51.6 FL (ref 35.1–46.3)
EOSINOPHIL # BLD AUTO: 0.2 X10(3) UL (ref 0–0.7)
EOSINOPHIL NFR BLD AUTO: 1.3 %
ERYTHROCYTE [DISTWIDTH] IN BLOOD BY AUTOMATED COUNT: 15.5 % (ref 11–15)
GLUCOSE BLD-MCNC: 113 MG/DL (ref 70–99)
HCT VFR BLD AUTO: 28.8 %
HGB BLD-MCNC: 9.5 G/DL
IMM GRANULOCYTES # BLD AUTO: 0.09 X10(3) UL (ref 0–1)
IMM GRANULOCYTES NFR BLD: 0.6 %
INR BLD: 1.23 (ref 0.9–1.2)
LYMPHOCYTES # BLD AUTO: 2.08 X10(3) UL (ref 1–4)
LYMPHOCYTES NFR BLD AUTO: 13.9 %
MCH RBC QN AUTO: 30.4 PG (ref 26–34)
MCHC RBC AUTO-ENTMCNC: 33 G/DL (ref 31–37)
MCV RBC AUTO: 92 FL
MONOCYTES # BLD AUTO: 1.2 X10(3) UL (ref 0.1–1)
MONOCYTES NFR BLD AUTO: 8 %
NEUTROPHILS # BLD AUTO: 11.38 X10 (3) UL (ref 1.5–7.7)
NEUTROPHILS # BLD AUTO: 11.38 X10(3) UL (ref 1.5–7.7)
NEUTROPHILS NFR BLD AUTO: 75.9 %
OSMOLALITY SERPL CALC.SUM OF ELEC: 296 MOSM/KG (ref 275–295)
PLATELET # BLD AUTO: 153 10(3)UL (ref 150–450)
POTASSIUM SERPL-SCNC: 3.9 MMOL/L (ref 3.5–5.1)
PROTHROMBIN TIME: 15.3 SECONDS (ref 11.8–14.5)
RBC # BLD AUTO: 3.13 X10(6)UL
SODIUM SERPL-SCNC: 142 MMOL/L (ref 136–145)
WBC # BLD AUTO: 15 X10(3) UL (ref 4–11)

## 2020-09-19 PROCEDURE — 85610 PROTHROMBIN TIME: CPT | Performed by: NURSE PRACTITIONER

## 2020-09-19 PROCEDURE — 80048 BASIC METABOLIC PNL TOTAL CA: CPT | Performed by: NURSE PRACTITIONER

## 2020-09-19 PROCEDURE — 97116 GAIT TRAINING THERAPY: CPT

## 2020-09-19 PROCEDURE — 85025 COMPLETE CBC W/AUTO DIFF WBC: CPT | Performed by: NURSE PRACTITIONER

## 2020-09-19 PROCEDURE — 97110 THERAPEUTIC EXERCISES: CPT

## 2020-09-19 NOTE — PHYSICAL THERAPY NOTE
PHYSICAL THERAPY TREATMENT NOTE - INPATIENT     Room Number: 330/330-A       Presenting Problem: R popliteal artery aneursym repair by Dr. Celso Ware 9/16    Problem List  Principal Problem:    Popliteal artery aneurysm [facility])  Active Problems:    Hematuria Static Sitting: Good  Dynamic Sitting: Fair +           Static Standing: Fair  Dynamic Standing: Fair -    ACTIVITY TOLERANCE                         O2 WALK                  AM-PAC '6-Clicks' INPATIENT SHORT FORM - BASIC MOBILITY  How much diffi with RW   Goal #3 Patient is able to ambulate 150 feet with assist device: walker - rolling at assistance level: modified independent   Goal #3   Current Status SBA with ' with a step to gait   Goal #4 Patient will negotiate one curb w/ assistive dev

## 2020-09-19 NOTE — PROGRESS NOTES
[facility] Hospitalist Progress Note     CC: Hospital Follow up    PCP: PHYSICIAN NONSTAFF       Assessment/Plan:     Principal Problem:    Popliteal artery aneurysm (HCC)  Active Problems:    Hematuria    Adrenal insufficiency ([address]    72 yo male with hx basal c flomax     #HL  -continue statin      #GOC  -full code  -lives with gabe Guzmán [phone]  -sister Brett Cobb [phone]     FEN  -lytes in am  -diet-general      Prophy  -SCD  -argatroban       Dispo: inpt  Code status:     Questions/concerns w 20* 16   CREATSERUM 1.24 0.97 0.98   GFRAA 68 92 91   GFRNAA 59* 79 78   CA 8.7 8.6 8.5    144 142   K 4.0 4.0 3.9    112 112   CO2 20.0* 26.0 25.0       No results for input(s): ALT, AST, ALB, AMYLASE, LIPASE, LDH in the last 168 hours.     Inv

## 2020-09-19 NOTE — PLAN OF CARE
Patient alert and oriented. Lying in bed resting. Denies pain. Dressing changed by Dr. Diamond Cullen. Verbal orders received for Ativan 1mg at night. Orders carried out. Possible discharge today. D/c instructions ok to take dressing off on Sunday to shower.  Pt in activity and pre-medicate as appropriate  Outcome: Progressing     Problem: RISK FOR INFECTION - ADULT  Goal: Absence of fever/infection during anticipated neutropenic period  Description: INTERVENTIONS  - Monitor WBC  - Administer growth factors as ordere as ordered  Outcome: Progressing

## 2020-09-19 NOTE — PROGRESS NOTES
[facility]    Endocrine Progress Note  [facility]    [facility] Patient Status:  Inpatient    10/2/1950 MRN [id number]   Location [facility] 3W/SW Attending Laly Leyva MD   Hosp Day # 3 PCP PHYSICIAN N pulse 80, temperature 97.9 °F (36.6 °C), temperature source Oral, resp. rate 16, height 6' 2\" (1.88 m), weight 260 lb (117.9 kg), SpO2 95 %.     Physical Exam:  General appearance: alert, appears stated age and cooperative  Pulmonary:  clear to auscultatio

## 2020-09-19 NOTE — PROGRESS NOTES
[facility] HOSP - [facility]     Vascular Surgery Progress Note    Kvng Hogan Patient Status:  Inpatient    10/2/1950 MRN [id number]   Location [facility] 3W/SW Attending Fatoumata Samayoa MD   Hosp Day # 3 PCP PHYSICIAN NONSTAFF     Object PRN      Laboratory/Data:    LABS:  Recent Labs   Lab 09/16/20  0728 09/17/20  0518 09/17/20  1434 09/18/20  0534 09/18/20  0751 09/19/20  0445   WBC  --  18.5*  --  16.3*  --  15.0*   HGB  --  10.6*  --  9.5*  --  9.5*   MCV  --  92.8  --  92.1  --  92.0

## 2020-09-20 LAB
INR BLD: 1.24 (ref 0.9–1.2)
PROTHROMBIN TIME: 15.4 SECONDS (ref 11.8–14.5)

## 2020-09-20 PROCEDURE — 85610 PROTHROMBIN TIME: CPT | Performed by: NURSE PRACTITIONER

## 2020-09-20 RX ORDER — HYDROCORTISONE 10 MG/1
10 TABLET ORAL NIGHTLY
Qty: 30 TABLET | Refills: 3 | Status: SHIPPED | OUTPATIENT
Start: 2020-09-20

## 2020-09-20 RX ORDER — FONDAPARINUX SODIUM 2.5 MG/.5ML
10 INJECTION SUBCUTANEOUS DAILY
Status: DISCONTINUED | OUTPATIENT
Start: 2020-09-20 | End: 2020-09-20

## 2020-09-20 RX ORDER — HYDROCORTISONE 10 MG/1
10 TABLET ORAL NIGHTLY
Status: DISCONTINUED | OUTPATIENT
Start: 2020-09-20 | End: 2020-09-22

## 2020-09-20 RX ORDER — HYDROCORTISONE 20 MG/1
20 TABLET ORAL EVERY MORNING
Status: DISCONTINUED | OUTPATIENT
Start: 2020-09-20 | End: 2020-09-20

## 2020-09-20 RX ORDER — WARFARIN SODIUM 10 MG/1
10 TABLET ORAL NIGHTLY
Status: DISCONTINUED | OUTPATIENT
Start: 2020-09-20 | End: 2020-09-22

## 2020-09-20 RX ORDER — HYDROCORTISONE 10 MG/1
20 TABLET ORAL EVERY MORNING
Status: DISCONTINUED | OUTPATIENT
Start: 2020-09-21 | End: 2020-09-22

## 2020-09-20 RX ORDER — HYDROCORTISONE 20 MG/1
20 TABLET ORAL DAILY
Qty: 30 TABLET | Refills: 3 | Status: SHIPPED | OUTPATIENT
Start: 2020-09-20 | End: 2021-05-06

## 2020-09-20 RX ORDER — FONDAPARINUX SODIUM 5 MG/.4ML
10 INJECTION SUBCUTANEOUS DAILY
Status: DISCONTINUED | OUTPATIENT
Start: 2020-09-20 | End: 2020-09-22

## 2020-09-20 NOTE — PROGRESS NOTES
[facility]     Vascular Surgery Progress Note    Maya Hogan Patient Status:  Inpatient    10/2/1950 MRN [id number]   Location [facility] 3W/SW Attending Yannick Ricci MD   Hosp Day # 4 PCP PHYSICIAN NONSTAFF     Object PRN      Laboratory/Data:    LABS:  Recent Labs   Lab 09/16/20  0728 09/17/20  0518 09/17/20  1434 09/18/20  0534 09/18/20  0751 09/19/20  0445 09/20/20  0529   WBC  --  18.5*  --  16.3*  --  15.0*  --    HGB  --  10.6*  --  9.5*  --  9.5*  --    MCV  --

## 2020-09-20 NOTE — PROGRESS NOTES
[facility]    Endocrine Progress Note  [facility]    [facility] Patient Status:  Inpatient    10/2/1950 MRN [id number]   Location [facility] 3W/SW Attending Duke Lawrence MD   Hosp Day # 4 PCP PHYSICIAN N 1.6 oz (114.4 kg), SpO2 97 %.     Physical Exam:  General appearance: alert, appears stated age and cooperative  Pulmonary:  clear to auscultation bilaterally  Cardiovascular: S1, S2 normal, no murmur, click, rub or gallop, regular rate and rhythm, no S3 or

## 2020-09-20 NOTE — CM/SW NOTE
Received call from Lonny today regarding patient's medication Arixtra (Fondaparinux Sodium) injection. Spoke with Tamiko RN and patient is receiving Arixtra 10mg/0.8ml daily as bridge to Coumadin. Patient has [facility].     Patient's INR : 1.24

## 2020-09-20 NOTE — PLAN OF CARE
Denies chest pain. Not in distress. INR today 1.2 - not therapeutic yet. Plan to bridge with arixtra.    Problem: CARDIOVASCULAR - ADULT  Goal: Maintains optimal cardiac output and hemodynamic stability  Description: INTERVENTIONS:  - Monitor vital signs, r

## 2020-09-20 NOTE — PLAN OF CARE
Problem: Patient Centered Care  Goal: Patient preferences are identified and integrated in the patient's plan of care  Description: Interventions:  - What would you like us to know as we care for you?   - Provide timely, complete, and accurate informatio safety including physical limitations  - Instruct pt to call for assistance with activity based on assessment  - Modify environment to reduce risk of injury  - Provide assistive devices as appropriate  - Consider OT/PT consult to assist with strengthening/

## 2020-09-20 NOTE — PLAN OF CARE
Problem: Patient Centered Care  Goal: Patient preferences are identified and integrated in the patient's plan of care  Description: Interventions:  - What would you like us to know as we care for you?   - Provide timely, complete, and accurate informatio Educate pt/family on patient safety including physical limitations  - Instruct pt to call for assistance with activity based on assessment  - Modify environment to reduce risk of injury  - Provide assistive devices as appropriate  - Consider OT/PT consult

## 2020-09-20 NOTE — PROGRESS NOTES
[facility] Hospitalist Progress Note     CC: Hospital Follow up    PCP: PHYSICIAN NONSTAFF       Assessment/Plan:     Principal Problem:    Popliteal artery aneurysm (HCC)  Active Problems:    Hematuria    Adrenal insufficiency ([address]    70 yo male with hx basal c OR  -warfarin resumed 9/17  -inr 1.23     #BPH  -continue flomax     #HL  -continue statin      #GOC  -full code  -lives with gabe Guzmán [phone]  -sister Niko Lara [phone]     FEN  -lytes in am  -diet-general      Prophy  -SCD  -argatroba Recent Labs   Lab 09/17/20  0518 09/18/20  0534 09/19/20  0445   * 114* 113*   BUN 14 20* 16   CREATSERUM 1.24 0.97 0.98   GFRAA 68 92 91   GFRNAA 59* 79 78   CA 8.7 8.6 8.5    144 142   K 4.0 4.0 3.9    112 112   CO2 20.0* 26.0

## 2020-09-20 NOTE — CONSULTS
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     Surek

## 2020-09-21 LAB
INR BLD: 1.54 (ref 0.9–1.2)
PROTHROMBIN TIME: 18.2 SECONDS (ref 11.8–14.5)

## 2020-09-21 PROCEDURE — 85610 PROTHROMBIN TIME: CPT | Performed by: NURSE PRACTITIONER

## 2020-09-21 NOTE — CM/SW NOTE
Case Management/ Progression of Care    F/U call made to [facility] for price check of Arixtra  RX.    Pricing information to call is  [phone]    Updated RX for quantity of medication, date of medica

## 2020-09-21 NOTE — CM/SW NOTE
I called the following pharmacies to inquire about the pricing and availability for Arixtra for #3 injections:    Eric [facility], [address] [place] [address] , [phone] -- > This pharmacy said that the medication is 'sp

## 2020-09-21 NOTE — PROGRESS NOTES
[facility] Hospitalist Team  Progress Note    Adriana Hogan Patient Status:  Inpatient    10/2/1950 MRN [id number]   Location [facility] 3W/SW Attending Franky Dutta MD   Hosp Day # 5 PCP PHYSICIAN NONSTAFF     CC: Follow Up  PCP: PHYSICIAN ZACH    Prophy  -SCD  -arixtra-->warfarin    Dispo  -home when INR > 2 or arixtra able to be obtained  PCP: PHYSICIAN NONSTAFF      Concerns regarding plan of care were discussed with patient. Patient agrees with plan as detailed above.  Discussed plan of care tab 20 mg, 20 mg, Oral, QAM    •  Warfarin Sodium (COUMADIN) tab 10 mg, 10 mg, Oral, Nightly    •  LORazepam (ATIVAN) tab 1 mg, 1 mg, Oral, Nightly    •  Fludrocortisone Acetate (FLORINEF) tab 0.05 mg, 0.05 mg, Oral, Daily    •  atorvastatin (LIPITOR) tab

## 2020-09-21 NOTE — PLAN OF CARE
Dressing change completed this AM. 10mg Coumadin administered last night. No c/o px or discomfort. Bed alarm/iBed awareness on. Call light within reach. Plan: Discharge w/ Arixtra to bridge w/ Coumadin pending insurance auth.      Problem: Patient Center for opioid side effects  - Notify MD/LIP if interventions unsuccessful or patient reports new pain  - Anticipate increased pain with activity and pre-medicate as appropriate  Outcome: Progressing     Problem: RISK FOR INFECTION - ADULT  Goal: Absence of fe ordered  - Initiate emergency measures for life threatening arrhythmias  - Monitor electrolytes and administer replacement therapy as ordered  Outcome: Progressing

## 2020-09-21 NOTE — PLAN OF CARE
Problem: Patient/Family Goals  Goal: Patient/Family Long Term Goal  Description: Patient's Long Term Goal: Go home    Interventions:  - Medication compliance  - Incision care  - Frequent rounds  - See additional Care Plan goals for specific interventions

## 2020-09-21 NOTE — PROGRESS NOTES
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     Alida Perales M.D.  [facility] Hematology and Oncology

## 2020-09-21 NOTE — PROGRESS NOTES
[facility] HOSP - [facility]    Endocrine Progress Note  [facility]    Taylor Hogan Patient Status:  Inpatient    10/2/1950 MRN [id number]   Location [facility] 3W/SW Attending Umesh Stein MD   Hosp Day # 5 PCP PHYSICIAN N height 6' 2\" (1.88 m), weight 250 lb 9.6 oz (113.7 kg), SpO2 97 %.     Physical Exam:  General appearance: alert, appears stated age and cooperative  Pulmonary:  clear to auscultation bilaterally  Cardiovascular: S1, S2 normal, no murmur, click, rub or gal

## 2020-09-22 VITALS
SYSTOLIC BLOOD PRESSURE: 117 MMHG | BODY MASS INDEX: 32.12 KG/M2 | HEIGHT: 74 IN | TEMPERATURE: 99 F | OXYGEN SATURATION: 98 % | HEART RATE: 86 BPM | WEIGHT: 250.31 LBS | DIASTOLIC BLOOD PRESSURE: 84 MMHG | RESPIRATION RATE: 20 BRPM

## 2020-09-22 LAB
ANION GAP SERPL CALC-SCNC: 6 MMOL/L (ref 0–18)
BASOPHILS # BLD AUTO: 0.06 X10(3) UL (ref 0–0.2)
BASOPHILS NFR BLD AUTO: 0.4 %
BUN BLD-MCNC: 18 MG/DL (ref 7–18)
BUN/CREAT SERPL: 16.1 (ref 10–20)
CALCIUM BLD-MCNC: 8.7 MG/DL (ref 8.5–10.1)
CHLORIDE SERPL-SCNC: 107 MMOL/L (ref 98–112)
CO2 SERPL-SCNC: 26 MMOL/L (ref 21–32)
CREAT BLD-MCNC: 1.12 MG/DL
DEPRECATED RDW RBC AUTO: 49.4 FL (ref 35.1–46.3)
EOSINOPHIL # BLD AUTO: 0.52 X10(3) UL (ref 0–0.7)
EOSINOPHIL NFR BLD AUTO: 3.6 %
ERYTHROCYTE [DISTWIDTH] IN BLOOD BY AUTOMATED COUNT: 14.6 % (ref 11–15)
GLUCOSE BLD-MCNC: 123 MG/DL (ref 70–99)
HCT VFR BLD AUTO: 32.6 %
HGB BLD-MCNC: 10.7 G/DL
IMM GRANULOCYTES # BLD AUTO: 0.14 X10(3) UL (ref 0–1)
IMM GRANULOCYTES NFR BLD: 1 %
INR BLD: 2.05 (ref 0.9–1.2)
LYMPHOCYTES # BLD AUTO: 2.32 X10(3) UL (ref 1–4)
LYMPHOCYTES NFR BLD AUTO: 16.2 %
MCH RBC QN AUTO: 30.5 PG (ref 26–34)
MCHC RBC AUTO-ENTMCNC: 32.8 G/DL (ref 31–37)
MCV RBC AUTO: 92.9 FL
MONOCYTES # BLD AUTO: 1.16 X10(3) UL (ref 0.1–1)
MONOCYTES NFR BLD AUTO: 8.1 %
NEUTROPHILS # BLD AUTO: 10.09 X10 (3) UL (ref 1.5–7.7)
NEUTROPHILS # BLD AUTO: 10.09 X10(3) UL (ref 1.5–7.7)
NEUTROPHILS NFR BLD AUTO: 70.7 %
OSMOLALITY SERPL CALC.SUM OF ELEC: 291 MOSM/KG (ref 275–295)
PLATELET # BLD AUTO: 216 10(3)UL (ref 150–450)
POTASSIUM SERPL-SCNC: 3.9 MMOL/L (ref 3.5–5.1)
PROTHROMBIN TIME: 22.8 SECONDS (ref 11.8–14.5)
RBC # BLD AUTO: 3.51 X10(6)UL
SODIUM SERPL-SCNC: 139 MMOL/L (ref 136–145)
WBC # BLD AUTO: 14.3 X10(3) UL (ref 4–11)

## 2020-09-22 PROCEDURE — 85025 COMPLETE CBC W/AUTO DIFF WBC: CPT | Performed by: NURSE PRACTITIONER

## 2020-09-22 PROCEDURE — 80048 BASIC METABOLIC PNL TOTAL CA: CPT | Performed by: NURSE PRACTITIONER

## 2020-09-22 PROCEDURE — 85610 PROTHROMBIN TIME: CPT | Performed by: NURSE PRACTITIONER

## 2020-09-22 RX ORDER — WARFARIN SODIUM 5 MG/1
TABLET ORAL
Qty: 30 TABLET | Refills: 11 | Status: SHIPPED | OUTPATIENT
Start: 2020-09-22 | End: 2021-09-17

## 2020-09-22 RX ORDER — ACETAMINOPHEN 325 MG/1
650 TABLET ORAL EVERY 6 HOURS PRN
Refills: 0 | Status: SHIPPED | COMMUNITY
Start: 2020-09-22

## 2020-09-22 NOTE — DISCHARGE SUMMARY
[facility] Hospitalist Discharge Summary   Patient ID:  Vargas Daniels  [id number]  71year old  10/2/1950    Admit date: 9/16/2020  Discharge date: 9/22/2020    Primary Care Physician: [address] [place] [address] ([facility])   Attending Physician: Attila Ken MD   Consults -S/P arixtra bridging, INR now 2. 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 Read the directions carefully, and ask your doctor or other care provider to review them with you.             CONTINUE taking these medications    atorvastatin 20 M

## 2020-09-22 NOTE — CM/SW NOTE
CM Discharge Planning    Patient discharging today to [facility] in [facility]. S/P Arixta bridging in the hospital, now on coumadin. Prescription canceled for Arixta at specialty Pharmacy. Patient will need an INR on Friday.      PLAN:   Home wit

## 2020-09-22 NOTE — PLAN OF CARE
Pt eagerly awaiting discharge. Another 10mg of Coumadin administered last night. No noted complaints. Refused SCDs despite education. Bed alarm/iBed awareness on. Call light within reach.      Plan: Discharge w/ Arixtra to bridge w/ Coumadin pending insuran and/or relaxation techniques  - Monitor for opioid side effects  - Notify MD/LIP if interventions unsuccessful or patient reports new pain  - Anticipate increased pain with activity and pre-medicate as appropriate  Outcome: Progressing     Problem: RISK FO antiarrhythmic and heart rate control medications as ordered  - Initiate emergency measures for life threatening arrhythmias  - Monitor electrolytes and administer replacement therapy as ordered  Outcome: Progressing

## 2020-10-08 ENCOUNTER — APPOINTMENT (OUTPATIENT)
Dept: GENERAL RADIOLOGY | Facility: HOSPITAL | Age: 70
DRG: 919 | End: 2020-10-08
Attending: EMERGENCY MEDICINE
Payer: MEDICARE

## 2020-10-08 ENCOUNTER — HOSPITAL ENCOUNTER (INPATIENT)
Facility: HOSPITAL | Age: 70
LOS: 6 days | Discharge: HOME HEALTH | DRG: 919 | End: 2020-10-14
Attending: EMERGENCY MEDICINE | Admitting: HOSPITALIST
Payer: MEDICARE

## 2020-10-08 DIAGNOSIS — N30.01 ACUTE CYSTITIS WITH HEMATURIA: ICD-10-CM

## 2020-10-08 DIAGNOSIS — A41.9 SEPSIS DUE TO UNDETERMINED ORGANISM (HCC): Primary | ICD-10-CM

## 2020-10-08 PROCEDURE — 87086 URINE CULTURE/COLONY COUNT: CPT | Performed by: EMERGENCY MEDICINE

## 2020-10-08 PROCEDURE — 87186 SC STD MICRODIL/AGAR DIL: CPT | Performed by: EMERGENCY MEDICINE

## 2020-10-08 PROCEDURE — 85610 PROTHROMBIN TIME: CPT | Performed by: EMERGENCY MEDICINE

## 2020-10-08 PROCEDURE — 87040 BLOOD CULTURE FOR BACTERIA: CPT | Performed by: EMERGENCY MEDICINE

## 2020-10-08 PROCEDURE — 85025 COMPLETE CBC W/AUTO DIFF WBC: CPT | Performed by: EMERGENCY MEDICINE

## 2020-10-08 PROCEDURE — 93010 ELECTROCARDIOGRAM REPORT: CPT | Performed by: EMERGENCY MEDICINE

## 2020-10-08 PROCEDURE — 87077 CULTURE AEROBIC IDENTIFY: CPT | Performed by: EMERGENCY MEDICINE

## 2020-10-08 PROCEDURE — 80048 BASIC METABOLIC PNL TOTAL CA: CPT | Performed by: EMERGENCY MEDICINE

## 2020-10-08 PROCEDURE — 99285 EMERGENCY DEPT VISIT HI MDM: CPT

## 2020-10-08 PROCEDURE — 87088 URINE BACTERIA CULTURE: CPT | Performed by: EMERGENCY MEDICINE

## 2020-10-08 PROCEDURE — 87150 DNA/RNA AMPLIFIED PROBE: CPT | Performed by: EMERGENCY MEDICINE

## 2020-10-08 PROCEDURE — 93005 ELECTROCARDIOGRAM TRACING: CPT

## 2020-10-08 PROCEDURE — 81001 URINALYSIS AUTO W/SCOPE: CPT | Performed by: EMERGENCY MEDICINE

## 2020-10-08 PROCEDURE — 36415 COLL VENOUS BLD VENIPUNCTURE: CPT

## 2020-10-08 PROCEDURE — 83605 ASSAY OF LACTIC ACID: CPT | Performed by: EMERGENCY MEDICINE

## 2020-10-08 PROCEDURE — 96365 THER/PROPH/DIAG IV INF INIT: CPT

## 2020-10-08 PROCEDURE — 71045 X-RAY EXAM CHEST 1 VIEW: CPT | Performed by: EMERGENCY MEDICINE

## 2020-10-08 RX ORDER — HYDROCORTISONE 10 MG/1
10 TABLET ORAL NIGHTLY
Status: DISCONTINUED | OUTPATIENT
Start: 2020-10-08 | End: 2020-10-09

## 2020-10-08 RX ORDER — METOCLOPRAMIDE HYDROCHLORIDE 5 MG/ML
10 INJECTION INTRAMUSCULAR; INTRAVENOUS EVERY 8 HOURS PRN
Status: DISCONTINUED | OUTPATIENT
Start: 2020-10-08 | End: 2020-10-14

## 2020-10-08 RX ORDER — HYDROCORTISONE 10 MG/1
20 TABLET ORAL DAILY
Status: DISCONTINUED | OUTPATIENT
Start: 2020-10-09 | End: 2020-10-09

## 2020-10-08 RX ORDER — ONDANSETRON 2 MG/ML
4 INJECTION INTRAMUSCULAR; INTRAVENOUS EVERY 4 HOURS PRN
Status: DISCONTINUED | OUTPATIENT
Start: 2020-10-08 | End: 2020-10-14

## 2020-10-08 RX ORDER — ONDANSETRON 2 MG/ML
4 INJECTION INTRAMUSCULAR; INTRAVENOUS EVERY 6 HOURS PRN
Status: DISCONTINUED | OUTPATIENT
Start: 2020-10-08 | End: 2020-10-14

## 2020-10-08 RX ORDER — WARFARIN SODIUM 7.5 MG/1
7.5 TABLET ORAL NIGHTLY
Status: DISCONTINUED | OUTPATIENT
Start: 2020-10-08 | End: 2020-10-09

## 2020-10-08 RX ORDER — ACETAMINOPHEN 325 MG/1
650 TABLET ORAL EVERY 6 HOURS PRN
Status: DISCONTINUED | OUTPATIENT
Start: 2020-10-08 | End: 2020-10-09

## 2020-10-08 RX ORDER — TAMSULOSIN HYDROCHLORIDE 0.4 MG/1
0.4 CAPSULE ORAL EVERY EVENING
Status: DISCONTINUED | OUTPATIENT
Start: 2020-10-08 | End: 2020-10-14

## 2020-10-08 RX ORDER — FLUDROCORTISONE ACETATE 0.1 MG/1
0.05 TABLET ORAL EVERY MORNING
Status: DISCONTINUED | OUTPATIENT
Start: 2020-10-09 | End: 2020-10-14

## 2020-10-08 RX ORDER — SODIUM CHLORIDE 9 MG/ML
INJECTION, SOLUTION INTRAVENOUS CONTINUOUS
Status: DISCONTINUED | OUTPATIENT
Start: 2020-10-08 | End: 2020-10-10

## 2020-10-08 RX ORDER — POLYETHYLENE GLYCOL 3350 17 G/17G
17 POWDER, FOR SOLUTION ORAL DAILY PRN
Status: DISCONTINUED | OUTPATIENT
Start: 2020-10-08 | End: 2020-10-14

## 2020-10-08 RX ORDER — GABAPENTIN 300 MG/1
300 CAPSULE ORAL 3 TIMES DAILY
Status: DISCONTINUED | OUTPATIENT
Start: 2020-10-08 | End: 2020-10-14

## 2020-10-08 RX ORDER — ACETAMINOPHEN 500 MG
1000 TABLET ORAL ONCE
Status: COMPLETED | OUTPATIENT
Start: 2020-10-08 | End: 2020-10-08

## 2020-10-08 RX ORDER — CLOPIDOGREL BISULFATE 75 MG/1
75 TABLET ORAL EVERY EVENING
Status: DISCONTINUED | OUTPATIENT
Start: 2020-10-09 | End: 2020-10-14

## 2020-10-08 RX ORDER — SODIUM CHLORIDE 0.9 % (FLUSH) 0.9 %
3 SYRINGE (ML) INJECTION AS NEEDED
Status: DISCONTINUED | OUTPATIENT
Start: 2020-10-08 | End: 2020-10-14

## 2020-10-08 RX ORDER — ATORVASTATIN CALCIUM 20 MG/1
20 TABLET, FILM COATED ORAL DAILY
Status: DISCONTINUED | OUTPATIENT
Start: 2020-10-09 | End: 2020-10-14

## 2020-10-08 RX ORDER — BISACODYL 10 MG
10 SUPPOSITORY, RECTAL RECTAL
Status: DISCONTINUED | OUTPATIENT
Start: 2020-10-08 | End: 2020-10-14

## 2020-10-08 NOTE — ED NOTES
Orders for admission, patient is aware of plan and ready to go upstairs. Any questions, please call ED ANITA Santizo  at extension [phone].    Type of COVID test sent: rapid  COVID Suspicion level: Low    Titratable drug(s) infusing: Sodium Chloride 0.9% 1000/hr

## 2020-10-08 NOTE — PROGRESS NOTES
[facility] Pharmacy Note: Antimicrobial Weight Based Dose Adjustment for: piperacillin/tazobactam (Kenroy Juvenal)    Yesenia Sheridan is a 79year old patient who has been prescribed piperacillin/tazobactam (ZOSYN) 3.375g Once. Estimated Creatinine Clearance: 51.

## 2020-10-08 NOTE — ED PROVIDER NOTES
Patient Seen in: [facility] Emergency Department    History   Patient presents with:  Difficulty Breathing      HPI    Patient presents to the ED complaining of shortness of breath for the past 2 days.   Recent aneurysm repair to his right leg 2 we time    •  atorvastatin 20 MG Oral Tab, 20 mg every morning.  , Disp: , Rfl: , 10/8/2020 at Unknown time    •  Clopidogrel Bisulfate 75 MG Oral Tab, Take 75 mg by mouth every evening.  , Disp: , Rfl: , 10/7/2020 at Unknown time    •  gabapentin 300 MG Oral Stethoscope and any equipment used during my examination was cleaned with super sani-cloth germicidal wipes following the exam.     Physical Exam   Constitutional: He is oriented to person, place, and time. He appears well-developed. No distress.    Obese 12.04 (*)     Neutrophil Absolute 12.04 (*)     Lymphocyte Absolute 0.55 (*)     All other components within normal limits   LACTIC ACID, PLASMA - Normal   RAPID SARS-COV-2 BY PCR - Normal   CBC WITH DIFFERENTIAL WITH PLATELET    Narrative:      The followi magnesium hydroxide (MILK OF MAGNESIA) 400 MG/5ML suspension 30 mL (has no administration in time range)   bisacodyl (DULCOLAX) rectal suppository 10 mg (has no administration in time range)   Piperacillin Sod-Tazobactam So (ZOSYN) 3.375 g in dextrose 5 prior medical records for any recent pertinent discharge summaries, testing, and procedures and reviewed those reports. Complicating Factors: The patient already has does not have any pertinent problems on file.  to contribute to the complexity of this E

## 2020-10-08 NOTE — H&P
[facility] Hospitalist H&P     CC: Patient presents with:  Difficulty Breathing       PCP: PHYSICIAN NONSTAFF      Assessment and Plan     Mr. Merlin Pang is a 78 yo male with hx basal cell ca, BPH, HTN, HL, abnl EKG, PAD dry gangrene S/P auto amputation, acute questions and note understanding and agreeing with therapeutic plan as outlined    Thank Rogelio Dawson MD  [facility] Hospitalist    Answering Service number: [phone]    HPI     Mr. Tomer Briones is a 80 yo male with hx basal cell ca, BPH, HTN, HL, abn mouth nightly., Disp: 30 tablet, Rfl: 3    •  hydrocortisone 20 MG Oral Tab, Take 1 tablet (20 mg total) by mouth daily. , Disp: 30 tablet, Rfl: 3    •  Fludrocortisone Acetate 0.1 MG Oral Tab, Take 0.5 tablets (0.05 mg total) by mouth daily.  (Patient devika 9.3       No results for input(s): ALT, AST, ALB, AMYLASE, LIPASE, LDH in the last 168 hours. Invalid input(s): ALPHOS, TBIL, DBIL, TPROT    No results for input(s): TROP in the last 168 hours.   Xr Chest Ap Portable  (cpt=71045)    Result Date: 10/8/202

## 2020-10-09 ENCOUNTER — APPOINTMENT (OUTPATIENT)
Dept: CV DIAGNOSTICS | Facility: HOSPITAL | Age: 70
DRG: 919 | End: 2020-10-09
Attending: INTERNAL MEDICINE
Payer: MEDICARE

## 2020-10-09 PROCEDURE — 85610 PROTHROMBIN TIME: CPT | Performed by: HOSPITALIST

## 2020-10-09 PROCEDURE — 93306 TTE W/DOPPLER COMPLETE: CPT | Performed by: INTERNAL MEDICINE

## 2020-10-09 PROCEDURE — 87040 BLOOD CULTURE FOR BACTERIA: CPT | Performed by: HOSPITALIST

## 2020-10-09 PROCEDURE — 99212 OFFICE O/P EST SF 10 MIN: CPT

## 2020-10-09 PROCEDURE — 87077 CULTURE AEROBIC IDENTIFY: CPT | Performed by: HOSPITALIST

## 2020-10-09 PROCEDURE — 83605 ASSAY OF LACTIC ACID: CPT | Performed by: HOSPITALIST

## 2020-10-09 PROCEDURE — 85027 COMPLETE CBC AUTOMATED: CPT | Performed by: HOSPITALIST

## 2020-10-09 PROCEDURE — 84132 ASSAY OF SERUM POTASSIUM: CPT | Performed by: HOSPITALIST

## 2020-10-09 PROCEDURE — 97161 PT EVAL LOW COMPLEX 20 MIN: CPT

## 2020-10-09 PROCEDURE — 87147 CULTURE TYPE IMMUNOLOGIC: CPT | Performed by: HOSPITALIST

## 2020-10-09 PROCEDURE — 97530 THERAPEUTIC ACTIVITIES: CPT

## 2020-10-09 PROCEDURE — 80048 BASIC METABOLIC PNL TOTAL CA: CPT | Performed by: HOSPITALIST

## 2020-10-09 RX ORDER — VANCOMYCIN 2 GRAM/500 ML IN 0.9 % SODIUM CHLORIDE INTRAVENOUS
25 ONCE
Status: COMPLETED | OUTPATIENT
Start: 2020-10-09 | End: 2020-10-09

## 2020-10-09 RX ORDER — POTASSIUM CHLORIDE 20 MEQ/1
40 TABLET, EXTENDED RELEASE ORAL EVERY 4 HOURS
Status: COMPLETED | OUTPATIENT
Start: 2020-10-09 | End: 2020-10-09

## 2020-10-09 RX ORDER — WARFARIN SODIUM 5 MG/1
2.5 TABLET ORAL NIGHTLY
Status: DISCONTINUED | OUTPATIENT
Start: 2020-10-09 | End: 2020-10-10

## 2020-10-09 RX ORDER — SODIUM CHLORIDE 9 MG/ML
INJECTION, SOLUTION INTRAVENOUS CONTINUOUS
Status: DISCONTINUED | OUTPATIENT
Start: 2020-10-10 | End: 2020-10-11

## 2020-10-09 RX ORDER — ACETAMINOPHEN 325 MG/1
650 TABLET ORAL EVERY 4 HOURS PRN
Status: DISCONTINUED | OUTPATIENT
Start: 2020-10-09 | End: 2020-10-14

## 2020-10-09 RX ORDER — VANCOMYCIN HYDROCHLORIDE
15 EVERY 24 HOURS
Status: DISCONTINUED | OUTPATIENT
Start: 2020-10-10 | End: 2020-10-10

## 2020-10-09 NOTE — PROGRESS NOTES
[facility] Dosing Service    Initial Pharmacokinetic Consult for Vancomycin Dosing     Thu Hayes is a 79year old patient who is being treated for bacteremia. Pharmacy has been asked to dose Vancomycin by Dr. Shaun Pennington.     Heparin    Vital Sig

## 2020-10-09 NOTE — CONSULTS
Pulmonary H&P/Consult     NAME: Kedar Hogan - ROOM: [address] [place] [address] MRN: [id number] - Age: 79year old - :  10/2/1950    Date of Admission: 10/8/2020  1:00 PM  Admission Diagnosis: Acute cystitis with hematuria [N30.01]  Sepsis due to undetermined o tPA, bypass   • OTHER Right 09/16/2020    right popliteal artery bypass     Family History   Problem Relation Age of Onset   • Other (SMALL CELL CANCER) Mother    • Clotting Disorder Neg    Social History    Tobacco Use      Smoking status: Former Smoker soft, non-tender, non-distended, positive BS. Extremity: no edema.  Left leg scar noted    LABS/STUDIES: Reviewed in [facility]  Recent Labs   Lab 10/08/20  1335 10/09/20  0643   RBC 3.90 3.29*   HGB 11.2* 9.3*   HCT 35.1* 29.8*   MCV 90.0 90.6   MCH 28.7 28.3

## 2020-10-09 NOTE — PLAN OF CARE
Problem: Patient Centered Care  Goal: Patient preferences are identified and integrated in the patient's plan of care  Description: Interventions:  - What would you like us to know as we care for you?  The doctors office sent me here  - Provide timely, co Assess patient’s ability to void and empty bladder  - Monitor intake/output and perform bladder scan as needed  - Follow urinary retention protocol/standard of care  - Consider collaborating with pharmacy to review patient's medication profile  - Implement

## 2020-10-09 NOTE — PLAN OF CARE
Patient came back up from 2D ECHO. VS were checked, BP 83/51 . Had previously discussed with Dr Emerald Lenz about patient's bp being low. Would continue to monitor patient, and see if after bolus patient's bp would stabilize.  Paged Dr Emerald Lenz asked f

## 2020-10-09 NOTE — WOUND PROGRESS NOTE
WOUND CARE NOTE    History:  Past Medical History:   Diagnosis Date   • Cataract    • DVT (deep venous thrombosis) ([facility] [address] 2005    left leg   • DVT (deep venous thrombosis) ([facility] [address]     2019, left leg (extensive)   • Dyslipidemia    • HIT (heparin-induced thr with me assessing the right leg wound. The pt. is s/p \" Right Popliteal Aneurysm, s/p Right Popliteal to Popliteal Artery Bypass by Dr. Dia Cunningham 9/16/20. The pt. saw Dr. Dia Cunningham in the office for a follow up 9/8/20 and was sent to the ED for sob. The pt.  is

## 2020-10-09 NOTE — CM/SW NOTE
10/09/20 1500   CM/SW Screening   Referral [phone] staff; Chart review;Nursing rounds   Patient's Mental Status Confused   Patient lives with Friend   MDO for dc planning.     Per ID note current infection will have to be

## 2020-10-09 NOTE — CONSULTS
[facility] AND [facility] Infectious Disease  Report of Consultation    uJanis Hogan Patient Status:  Inpatient    10/2/1950 MRN [id number]   Location [facility] 5SW/SE Attending Lamonte Metzger MD   Hosp Day # 1 PCP PHYSICIAN NONSTAFF Comment:HIT antibody positive as of 11/20/19; CODIE positive             (collected 11/21)    Medications:    Current Facility-Administered Medications:   •  Warfarin Sodium (COUMADIN) tab 2.5 mg, 2.5 mg, Oral, Nightly  •  vancomycin IVPB premix 2g in 0.9% N stridor. Cardiovascular: Negative for chest pain, palpitations, irregular heart beats. Gastrointestinal:  No abdominal pain, nausea, vomiting, diarrhea, or constipation. Genitourinary:  No dysuria, hematuria, urine urgency or frequency.    Integument/ murmurs. Abdomen: Soft, NT/ND. Bowel sounds present. No organomegaly. Extremity: Surgical sites appear largely clean. No erythema or induration. Skin: No rashes or lesions. Neurological: No focal neurologic deficits.     Lab Data Review:  Lab Res Disease  [phone]    10/9/2020  9:44 AM

## 2020-10-09 NOTE — PHYSICAL THERAPY NOTE
PHYSICAL THERAPY EVALUATION - INPATIENT     Room Number: 573/573-A  Evaluation Date: 10/9/2020  Type of Evaluation: Initial   Physician Order: PT Eval and Treat    Presenting Problem: sepsis  Reason for Therapy: Mobility Dysfunction and Discharge Planning discharge. DISCHARGE RECOMMENDATIONS  PT Discharge Recommendations: Home with home health PT;Sub-acute rehabilitation    PLAN  PT Treatment Plan: Body mechanics; Endurance; Energy conservation;Patient education;Gait training;Strengthening;Stair training;T Mod I with RW, community mobility.      SUBJECTIVE  \"I just dont feel good at all\"    PHYSICAL THERAPY EXAMINATION     OBJECTIVE  Precautions: Bed/chair alarm  Fall Risk: High fall risk    WEIGHT BEARING RESTRICTION  Weight Bearing Restriction: None met by: 10/16/20  Patient Goal Patient's self-stated goal is: return home.     Goal #1 Patient is able to demonstrate supine - sit EOB @ level: independent     Goal #1   Current Status    Goal #2 Patient is able to demonstrate transfers EOB to/from Chair/Wh

## 2020-10-09 NOTE — PROGRESS NOTES
[facility] Hospitalist Progress Note     PCP: PHYSICIAN NONSTAFF    CC: Follow up       Assessment/Plan:     Mr. Mohinder Lopez is a 80 yo male with hx basal cell ca, BPH, HTN, HL, abnl EKG, PAD dry gangrene S/P auto amputation, acute left popliteal artery thrombo bedside. Total Time spent with patient and coordinating care:  >35 minutes    Thank Charity Vance M.D. 10/9/20  [facility] Hospitalist  Answering Service: [phone]        Subjective     102.7 tmax  Feels very tired  No CP or SOB.   N/V magnesium hydroxide, bisacodyl, influenza virus vaccine PF    Data Review:       Labs:     Recent Labs   Lab 10/08/20  1335 10/09/20  0643   WBC 13.6* 8.5   HGB 11.2* 9.3*   MCV 90.0 90.6   .0 147.0*   INR 2.94* 4.19*       Recent Labs   Lab 10/08/2

## 2020-10-09 NOTE — PLAN OF CARE
Patient was seen by ICU doctor, plan to transfer patient to ICU. Patient has received 1L bolus, another bolus was started, infusing at this time. Patient's bp now 96/41 HR 87. Patient denies CP, SOB. Awaiting for icu bed.

## 2020-10-10 ENCOUNTER — APPOINTMENT (OUTPATIENT)
Dept: GENERAL RADIOLOGY | Facility: HOSPITAL | Age: 70
DRG: 919 | End: 2020-10-10
Attending: INTERNAL MEDICINE
Payer: MEDICARE

## 2020-10-10 PROCEDURE — 71045 X-RAY EXAM CHEST 1 VIEW: CPT | Performed by: INTERNAL MEDICINE

## 2020-10-10 PROCEDURE — 84100 ASSAY OF PHOSPHORUS: CPT | Performed by: HOSPITALIST

## 2020-10-10 PROCEDURE — 81003 URINALYSIS AUTO W/O SCOPE: CPT | Performed by: INTERNAL MEDICINE

## 2020-10-10 PROCEDURE — 85610 PROTHROMBIN TIME: CPT | Performed by: INTERNAL MEDICINE

## 2020-10-10 PROCEDURE — 85025 COMPLETE CBC W/AUTO DIFF WBC: CPT | Performed by: HOSPITALIST

## 2020-10-10 PROCEDURE — 85025 COMPLETE CBC W/AUTO DIFF WBC: CPT | Performed by: INTERNAL MEDICINE

## 2020-10-10 PROCEDURE — 80053 COMPREHEN METABOLIC PANEL: CPT | Performed by: INTERNAL MEDICINE

## 2020-10-10 PROCEDURE — 83605 ASSAY OF LACTIC ACID: CPT | Performed by: HOSPITALIST

## 2020-10-10 PROCEDURE — 85730 THROMBOPLASTIN TIME PARTIAL: CPT | Performed by: INTERNAL MEDICINE

## 2020-10-10 RX ORDER — CEFAZOLIN SODIUM/WATER 2 G/20 ML
2 SYRINGE (ML) INTRAVENOUS EVERY 8 HOURS
Status: DISCONTINUED | OUTPATIENT
Start: 2020-10-10 | End: 2020-10-14

## 2020-10-10 NOTE — PROGRESS NOTES
[facility] AND [facility] Infectious Disease Progress Note    [facility]:  [facility]    10/2/1950 MRN [id number]   Location [facility] 2W/SW Attending Antoine Lopez MD   [facility] Day # 2 PCP PHYSICIAN NONSTAFF     Subjective: Oral, Daily  •  Clopidogrel Bisulfate (PLAVIX) tab 75 mg, 75 mg, Oral, QPM  •  Fludrocortisone Acetate (FLORINEF) tab 0.05 mg, 0.05 mg, Oral, QAM  •  gabapentin (NEURONTIN) cap 300 mg, 300 mg, Oral, TID  •  tamsulosin HCl (FLOMAX) cap 0.4 mg, 0.4 mg, Oral, Assessment/Plan:    1.   MSSA sepsis  -on norepinephrine  -1/2 blood cultures with MSSA on 10/8  -repeat cultures on 10/9 with [facility]  -will repeat culture  -TTE poor quality due to body habitus, may need CHEO (wait till more stable)  -possibly from rec

## 2020-10-10 NOTE — PROGRESS NOTES
[facility] Hospitalist Progress Note     PCP: PHYSICIAN NONSTAFF    CC: Follow up       Assessment/Plan:     Mr. Treva Kang is a 78 yo male with hx basal cell ca, BPH, HTN, HL, abnl EKG, PAD dry gangrene S/P auto amputation, acute left popliteal artery thrombo nightly  -hypotension developed late 10/9, see above      BPH  -continue flomax     #HL  -continue statin      GOC  -full code  -lives with gabe Guzmán [phone]  -sister Kelly Palma [phone]     FEN  -IVF at 80  -lytes in AM  -Cardiac diet    succinate PF  50 mg Intravenous Q8H [facility]   • atorvastatin  20 mg Oral Daily   • Clopidogrel Bisulfate  75 mg Oral QPM   • Fludrocortisone Acetate  0.05 mg Oral QAM   • gabapentin  300 mg Oral TID   • tamsulosin HCl  0.4 mg Oral QPM     • norepinephrine 8:21 AM     Finalized by (CST): Caren Berg MD on 10/10/2020 at 8:22 AM          Xr Chest Ap Portable  (cpt=71045)    Result Date: 10/8/2020  CONCLUSION:  1.  No acute disease in the chest.    Dictated by (CST): Gio Cobian MD on 10/08/2020 at 2:12 PM

## 2020-10-10 NOTE — PROGRESS NOTES
Critical Care Progress Note     Assessment / Plan:  1. Septic shock - due to below.  LA normalized  - on low dose norepinephrine; wean as able  - IVFs  - abx per ID  - stress dose steroids; known history of AI  2. E. coli UTI and MSSA bacteremia - concern f

## 2020-10-10 NOTE — OCCUPATIONAL THERAPY NOTE
Attempted OT eval. Pt transferred to CCU on 10/9. INR: 5.49. Will hold therapy based on rehab protocol.  Will re-attempt for 10/11

## 2020-10-10 NOTE — PHYSICAL THERAPY NOTE
Attempted PT treatment- pt transferred to CCU on 10/9. INR: 5.49. Based on rehab protocol, pt is not within safe parameters to mobilize at this time.  Will reschedule for 10/11    Thanks you,  Reg Rodgers, PT, DPT

## 2020-10-11 PROCEDURE — 97166 OT EVAL MOD COMPLEX 45 MIN: CPT

## 2020-10-11 PROCEDURE — 87040 BLOOD CULTURE FOR BACTERIA: CPT | Performed by: NURSE PRACTITIONER

## 2020-10-11 PROCEDURE — 85610 PROTHROMBIN TIME: CPT | Performed by: HOSPITALIST

## 2020-10-11 PROCEDURE — 85025 COMPLETE CBC W/AUTO DIFF WBC: CPT | Performed by: HOSPITALIST

## 2020-10-11 PROCEDURE — 84132 ASSAY OF SERUM POTASSIUM: CPT | Performed by: HOSPITALIST

## 2020-10-11 PROCEDURE — 97535 SELF CARE MNGMENT TRAINING: CPT

## 2020-10-11 PROCEDURE — 80069 RENAL FUNCTION PANEL: CPT | Performed by: HOSPITALIST

## 2020-10-11 RX ORDER — HYDROCORTISONE 10 MG/1
10 TABLET ORAL NIGHTLY
Status: DISCONTINUED | OUTPATIENT
Start: 2020-10-11 | End: 2020-10-14

## 2020-10-11 RX ORDER — HYDROCORTISONE 10 MG/1
20 TABLET ORAL DAILY
Status: DISCONTINUED | OUTPATIENT
Start: 2020-10-12 | End: 2020-10-14

## 2020-10-11 RX ORDER — POTASSIUM CHLORIDE 20 MEQ/1
40 TABLET, EXTENDED RELEASE ORAL EVERY 4 HOURS
Status: COMPLETED | OUTPATIENT
Start: 2020-10-11 | End: 2020-10-11

## 2020-10-11 NOTE — PROGRESS NOTES
Vascular Surgery Progress Note    Pt seen and examined this am. Pt continues to improve. Denies any pain in the leg. Off levo and now normotensive and not tachycardic. Remains afebrile. On exam, feet warm and well perfused. Palp DP on the right.  Only mild

## 2020-10-11 NOTE — PLAN OF CARE
Problem: Patient/Family Goals  Goal: Patient/Family Long Term Goal  Description: Patient's Long Term Goal: to be discharged home    Interventions:  - follow MD orders  - See additional Care Plan goals for specific interventions  10/11/2020 0912 by Robert Mandel goal  Description: INTERVENTIONS:  - Encourage pt to monitor pain and request assistance  - Assess pain using appropriate pain scale  - Administer analgesics based on type and severity of pain and evaluate response  - Implement non-pharmacological measures appropriate  - Assess patient's ability to be responsible for managing their own health  - Refer to Case Management Department for coordinating discharge planning if the patient needs post-hospital services based on physician/LIP order or complex needs rel

## 2020-10-11 NOTE — PROGRESS NOTES
[facility] Hospitalist Progress Note     PCP: PHYSICIAN NONSTAFF    CC: Follow up       Assessment/Plan:     Mr. Bora Rankin is a 80 yo male with hx basal cell ca, BPH, HTN, HL, abnl EKG, PAD dry gangrene S/P auto amputation, acute left popliteal artery thrombo or 5 mg.       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 No significant change since prior exam.  Prominence of the interstitium likely representing senescent changes of mild fibrosis and atelectasis/scar.    [facility] provided a preliminary re

## 2020-10-11 NOTE — OCCUPATIONAL THERAPY NOTE
OCCUPATIONAL THERAPY EVALUATION - INPATIENT     Room Number: 570/570-A  Evaluation Date: 10/11/2020  Type of Evaluation: Initial  Presenting Problem: (sepsis)    Physician Order: IP Consult to Occupational Therapy  Reason for Therapy: ADL/IADL Dysfunction Activity Short Form. Research supports that patients with this level of impairment may benefit from [facility]. He was left in chair with call light in reach and all needs met. Notified RN.      DISCHARGE RECOMMENDATIONS  OT Discharge Recommendations: Home with assist.    SUBJECTIVE  \"I can pretty much do everything by myself. \"    OCCUPATIONAL THERAPY EXAMINATION      OBJECTIVE  Precautions: Bed/chair alarm  Fall Risk: High fall risk    PAIN ASSESSMENT  Ratin          ACTIVITY TOLERANCE        WNL functional transfer with SBA using RW  Comment:     Patient will complete all dressing with SBA  Comment:         Patient will complete item retrieval with SBA using RW   Comment:          Goals  on: 10/18  Frequency: 3x  aweek    Jef Pagan MOT/R

## 2020-10-11 NOTE — PROGRESS NOTES
Critical Care Progress Note     Assessment / Plan:  1. Septic shock - due to below. LA normalized  - off vasopressors  - dc IVFs  - abx per ID  - stress dose steroids d/c'd.  Resumed usual home dose for AI  2. E. coli UTI and MSSA bacteremia - concern for i

## 2020-10-11 NOTE — PROGRESS NOTES
Yajaira Sifuentes is a 79year old male. Patient presents with:  Difficulty Breathing      HPI:    Resting in bed in nad    REVIEW OF SYSTEMS:   A comprehensive 11 point review of systems was completed.   Pertinent positives and negatives noted in the t bacteremia  -1/2 blood cultures with MSSA on 10/8  -repeat cultures positive on 10/ but negative so far 10/11  -TTE poor quality due to body habitus, may need CHEO   -possibly from recent surgery  -s/p R arterial bypass with gortex graft on 9/16  -on IV anc Negative mg/dL    Bilirubin Urine Negative Negative    Ketones Urine Negative Negative mg/dL    Blood Urine Negative Negative    pH Urine 5.0 5.0 - 8.0    Protein Urine 30  (A) Negative mg/dL    Urobilinogen Urine <2.0 <2.0    Nitrite Urine Positive (A) Ne PLASMA   Result Value Ref Range    Lactic Acid 2.2 (H) 0.4 - 2.0 mmol/L   LACTIC ACID 3 HR POST POSITIVE   Result Value Ref Range    Lactic Acid 3.2 (H) 0.4 - 2.0 mmol/L   COMP METABOLIC PANEL (14)   Result Value Ref Range    Glucose 178 (H) 70 - 99 mg/dL REFLEX    Specimen: Urine   Result Value Ref Range    Urine Color Yellow Yellow    Clarity Urine Clear Clear    Spec Gravity 1.008 1.002 - 1.035    Glucose Urine Negative Negative mg/dL    Bilirubin Urine Negative Negative    Ketones Urine Negative Negativ Resulted    RAINBOW DRAW GOLD   Result Value Ref Range    Hold Gold Auto Resulted    RAINBOW DRAW LAVENDER   Result Value Ref Range    Hold Lavender Auto Resulted    RAINBOW DRAW LIGHT GREEN   Result Value Ref Range    Hold Lt Green Auto Resulted    BLOOD Range    Blood Culture Result Staphylococcus aureus (A)     Blood Smear Positive Blood Culture (A)     Blood Smear Gram positive cocci in clusters (A)    CBC W/ DIFFERENTIAL   Result Value Ref Range    WBC 13.6 (H) 4.0 - 11.0 x10(3) uL    RBC 3.90 3.80 - 5 WBC 3.5 (L) 4.0 - 11.0 x10(3) uL    RBC 2.95 (L) 3.80 - 5.80 x10(6)uL    HGB 8.5 (L) 13.0 - 17.5 g/dL    HCT 26.8 (L) 39.0 - 53.0 %    MCV 90.8 80.0 - 100.0 fL    MCH 28.8 26.0 - 34.0 pg    MCHC 31.7 31.0 - 37.0 g/dL    RDW-SD 48.4 (H) 35.1 - 46.3 fL

## 2020-10-11 NOTE — PROGRESS NOTES
Patient to transfer from 202 to 570. Report given to [facility] RN. Medications, labs, plan of care reviewed. All belongings with patient. Nursing provided patient with current list of medications; reviewed purpose and side effects of medications.  No further q

## 2020-10-12 PROCEDURE — 85610 PROTHROMBIN TIME: CPT | Performed by: INTERNAL MEDICINE

## 2020-10-12 PROCEDURE — 85025 COMPLETE CBC W/AUTO DIFF WBC: CPT | Performed by: HOSPITALIST

## 2020-10-12 PROCEDURE — 80048 BASIC METABOLIC PNL TOTAL CA: CPT | Performed by: HOSPITALIST

## 2020-10-12 RX ORDER — WARFARIN SODIUM 5 MG/1
5 TABLET ORAL NIGHTLY
Status: DISCONTINUED | OUTPATIENT
Start: 2020-10-13 | End: 2020-10-12

## 2020-10-12 RX ORDER — POTASSIUM CHLORIDE 20 MEQ/1
40 TABLET, EXTENDED RELEASE ORAL EVERY 4 HOURS
Status: COMPLETED | OUTPATIENT
Start: 2020-10-12 | End: 2020-10-12

## 2020-10-12 NOTE — PLAN OF CARE
Problem: Patient Centered Care  Goal: Patient preferences are identified and integrated in the patient's plan of care  Description: Interventions:  - What would you like us to know as we care for you?  The doctors office sent me here  - Provide timely, co needed  - Assess and instruct to report SOB or any respiratory difficulty  - Respiratory Therapy support as indicated  - Manage/alleviate anxiety  - Monitor for signs/symptoms of CO2 retention  10/11/2020 1937 by Juan Perez RN  Outcome: Progressing patient reports new pain  - Anticipate increased pain with activity and pre-medicate as appropriate  10/11/2020 1937 by Juan Perez RN  Outcome: Progressing  10/11/2020 1937 by Juan Perez RN  Outcome: Progressing     Problem: SAFETY ADULT - FA Progressing     Problem: CARDIOVASCULAR - ADULT  Goal: Maintains optimal cardiac output and hemodynamic stability  Description: INTERVENTIONS:  - Monitor vital signs, rhythm, and trends  - Monitor for bleeding, hypotension and signs of decreased cardiac ou

## 2020-10-12 NOTE — PROGRESS NOTES
[facility] Hospitalist Progress Note     PCP: PHYSICIAN NONSTAFF    CC: Follow up       Assessment/Plan:     Mr. 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  No acute disease in the chest.    Dictated by (CST): Henry Quijano MD on 10/08/2020 at 2:12 PM     Finalized by (CST): Henry Quijano MD on 10/08/2020 at 2:15 PM

## 2020-10-12 NOTE — PROGRESS NOTES
[facility] AND [facility] Infectious Disease  Progress Note    Erika Hogan Patient Status:  Inpatient    10/2/1950 MRN [id number]   Location [facility] 5SW/SE Attending Tamara Adames MD   [facility] Day # 4 PCP PHYSICIAN NONSTAFF     Subjective: ongoing     2.  E.coli UTI  - IV cefazolin as above     3.  Leukocytosis due to the above  - At 4.9K today and improving     4. High fevers due to the above  - Resolved     5. Disposition - inpatient.   Greatest concern at this time would be for infection

## 2020-10-12 NOTE — PAYOR COMM NOTE
--------------  ADMISSION REVIEW     Payor: Nora Szymanski  #:  [id number]  Authorization Number: [id number]    Admit date: 10/8/20  Admit time: 1559       Admitting Physician: Lamonte Metzger MD  Attending Physician:  Adin Sevilla MD  Primary C needed. , Disp:  , Rfl: 0    •  hydrocortisone 10 MG Oral Tab, Take 1 tablet (10 mg total) by mouth nightly., Disp: 30 tablet, Rfl: 3, 10/7/2020 at Unknown time    •  hydrocortisone 20 MG Oral Tab, Take 1 tablet (20 mg total) by mouth daily. , Disp: 30 table reviewed from today, pertinent positives to the presenting problem noted.     Physical Exam     ED Triage Vitals [10/08/20 1251]   /66   Pulse 115   Resp 26   Temp (!) 102.8 °F (39.3 °C)   Temp src Oral   SpO2 100 %   O2 Device None (Room air)       A the following components:    Clarity Urine Cloudy (*)     Protein Urine 30  (*)     Nitrite Urine Positive (*)     Leukocyte Esterase Urine Large (*)     WBC Urine 296 (*)     WBC Clump Few (*)     RBC URINE 21 (*)     Bacteria Urine Many (*)     All other 10/08/2020 at 2:15 PM          ED Medications Administered:   Medications   ondansetron HCl (ZOFRAN) injection 4 mg (has no administration in time range)   Normal Saline Flush 0.9 % injection 3 mL (has no administration in time range)   acetaminophen (TYLE 1518)         MDM      10/08/20  1512 10/08/20  1515 10/08/20  1530 10/08/20  1610   BP:  104/50 119/58 134/59   BP Location:    Right arm   Pulse:  94 91 95   Resp:  17 18 18   Temp: (!) 100.6 °F (38.1 °C)   99.1 °F (37.3 °C)   TempSrc: Oral   Oral   SpO2 on Admission  Date Reviewed: 10/8/2020          ICD-10-CM Noted POA    * (Principal) Sepsis due to undetermined organism [facility]) A41.9 10/8/2020 Unknown    Acute cystitis with hematuria N30.01 10/8/2020                   Signed by Mercedes Hernandez MD on 10 nightly  - no hypotension here, monitor      BPH  -continue flomax     #HL  -continue statin      GOC  -full code  -lives with gabe Guzmán [phone]  -sister Kiersten Sow [phone]    FEN  -IVF at 80  -lytes in AM  -Cardiac diet    PPX; INR 2.9, Thrombectomy, tPA, bypass   • OTHER Right 09/16/2020    right popliteal artery bypass        ALL:    Heparin                 OTHER (SEE COMMENTS)    Comment: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  B/l Lower extremity edema, worse in right leg chronically since bypass surgery, unchanged, no calf tenderness  Skin: no rashes or lesions, well p gabapentin (NEURONTIN) cap 300 mg     Date Action Dose Route User    10/12/2020 1000 Given 300 mg Oral Regina Benton RN    10/11/2020 2013 Given 300 mg Oral Gladys Baxter RN    10/11/2020 1617 Given 300 mg Oral Pearla Crigler, RN      hydrocortisone wheezing, dyspnea on exertion, or stridor. Cardiovascular: Negative for chest pain, palpitations, irregular heart beats. Gastrointestinal:  No abdominal pain, nausea, vomiting, diarrhea, or constipation.                 Genitou Nares normal.                Throat:  Oropharynx clear, MMs moist.                Neck: Trachea ML, no masses. Lungs: CTA b/l no rhonchi, rales, wheezes. Chest wall: No tenderness or deformity.                 Heart: Regular ra antibiotics followed by p.o. suppression. Will also treat UTI concurrently. 2D echo to be obtained and if any further positive blood cultures will need a CHEO. D/w patient at length. PICC when cultures clear and will streamline antibiotics as able.   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 TTE with no e/o endocarditis  - vanc and zosyn  - ID following  11. PAD s/p right pop-pop bypass  - vascular surgery following  12.  Right leg DVT - s/p left SFA-peroneal artery bypass and thrombectomy  - supratherapeutic INR; war urinary source.   Mony Many in urine and MSSA bacteremia     Sepsic shock due to ecoli urinary tract infection and MSSA bacteremia   -Febrile, with leukocytosis on admission, monitor curves  -CAUTI due to UTI prior admit  -vanc/zosyn changed to cefazolin based (1.88 m), weight 255 lb 15.3 oz (116.1 kg), SpO2 99 %. Temp (24hrs), Av.4 °F (36.9 °C), Min:97 °F (36.1 °C), Max:99.8 °F (37.7 °C)        HEENT: Exam is unremarkable. Without scleral icterus. Mucous membranes are moist. PERRLA.   Oropharynx is clear Dispo  -streamline to cefazolin  -anticipate PICC once blood cultures cleared, will need 4 weeks of IV abx followed by suppressive PO

## 2020-10-12 NOTE — PLAN OF CARE
Problem: Patient Centered Care  Goal: Patient preferences are identified and integrated in the patient's plan of care  Description: Interventions:  - What would you like us to know as we care for you?  The doctors office sent me here  - Provide timely, co Assess patient’s ability to void and empty bladder  - Monitor intake/output and perform bladder scan as needed  - Follow urinary retention protocol/standard of care  - Consider collaborating with pharmacy to review patient's medication profile  - Implement Consider OT/PT consult to assist with strengthening/mobility  - Encourage toileting schedule  Outcome: Progressing     Problem: DISCHARGE PLANNING  Goal: Discharge to home or other facility with appropriate resources  Description: INTERVENTIONS:  - Identif

## 2020-10-13 PROCEDURE — 85025 COMPLETE CBC W/AUTO DIFF WBC: CPT | Performed by: INTERNAL MEDICINE

## 2020-10-13 PROCEDURE — 80048 BASIC METABOLIC PNL TOTAL CA: CPT | Performed by: INTERNAL MEDICINE

## 2020-10-13 PROCEDURE — 85610 PROTHROMBIN TIME: CPT | Performed by: INTERNAL MEDICINE

## 2020-10-13 PROCEDURE — 84132 ASSAY OF SERUM POTASSIUM: CPT | Performed by: INTERNAL MEDICINE

## 2020-10-13 RX ORDER — POTASSIUM CHLORIDE 20 MEQ/1
40 TABLET, EXTENDED RELEASE ORAL EVERY 4 HOURS
Status: COMPLETED | OUTPATIENT
Start: 2020-10-13 | End: 2020-10-13

## 2020-10-13 NOTE — CM/SW NOTE
CM f/u on dc planning for long term IV abx   Home w/HH vs. SNF    CM met with pt at bedside w/RN [facility] in room. Pt aware of plan for PICC LINE for long term IV antibiotics. However, declines SNF. Pt wants to be home w/fiancee.     Pt will be dischar

## 2020-10-13 NOTE — CM/SW NOTE
CM followed up on pending ref for HHC/home abx inf. Clinical updates sent and copy of script added to ref. F2F and orders done, PCP is Dr Deatra Kayser at [facility]. [facility]. CM will present choices when available.     1300  CM met with julieta

## 2020-10-13 NOTE — PROGRESS NOTES
[facility] Hospitalist Progress Note     PCP: PHYSICIAN NONSTAFF    CC: Follow up       Assessment/Plan:     Mr. 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  No acute disease in the chest.    Dictated by (CST): Jaimee Davis MD on 10/08/2020 at 2:12 PM     Finalized by (CST): Jaimee Davis MD on 10/08/2020 at 2:15 PM

## 2020-10-13 NOTE — PROGRESS NOTES
[facility] AND [facility] Infectious Disease Progress Note    Satinder Patient Status:  Inpatient    10/2/1950 MRN [id number]   Location [facility] 2W/SW Attending Kalin Wesley MD   [facility] Day # 5 PCP PHYSICIAN NONSTAFF     Subjective: temperature 98.1 °F (36.7 °C), temperature source Oral, resp. rate 18, height 6' 2\" (1.88 m), weight 280 lb 3.2 oz (127.1 kg), SpO2 97 %. Temp (24hrs), Av.4 °F (36.9 °C), Min:97 °F (36.1 °C), Max:99.8 °F (37.7 °C)      HEENT: Exam is unremarkable. in chart    If you have any questions or concerns please call [facility] at [phone].      Daniela Crandall NP

## 2020-10-13 NOTE — PLAN OF CARE
Problem: Patient Centered Care  Goal: Patient preferences are identified and integrated in the patient's plan of care  Description: Interventions:  - What would you like us to know as we care for you?  The doctors office sent me here  - Provide timely, co Assess patient’s ability to void and empty bladder  - Monitor intake/output and perform bladder scan as needed  - Follow urinary retention protocol/standard of care  - Consider collaborating with pharmacy to review patient's medication profile  - Implement reports new pain  - Anticipate increased pain with activity and pre-medicate as appropriate  Outcome: Progressing     Problem: SAFETY ADULT - FALL  Goal: Free from fall injury  Description: INTERVENTIONS:  - Assess pt frequently for physical needs  - Ident

## 2020-10-14 ENCOUNTER — APPOINTMENT (OUTPATIENT)
Dept: PICC SERVICES | Facility: HOSPITAL | Age: 70
DRG: 919 | End: 2020-10-14
Attending: NURSE PRACTITIONER
Payer: MEDICARE

## 2020-10-14 VITALS
WEIGHT: 256.5 LBS | HEIGHT: 74 IN | BODY MASS INDEX: 32.92 KG/M2 | OXYGEN SATURATION: 99 % | DIASTOLIC BLOOD PRESSURE: 76 MMHG | TEMPERATURE: 99 F | HEART RATE: 73 BPM | SYSTOLIC BLOOD PRESSURE: 117 MMHG | RESPIRATION RATE: 18 BRPM

## 2020-10-14 PROCEDURE — 36573 INSJ PICC RS&I 5 YR+: CPT

## 2020-10-14 PROCEDURE — 85610 PROTHROMBIN TIME: CPT | Performed by: INTERNAL MEDICINE

## 2020-10-14 PROCEDURE — 97110 THERAPEUTIC EXERCISES: CPT

## 2020-10-14 PROCEDURE — 85025 COMPLETE CBC W/AUTO DIFF WBC: CPT | Performed by: INTERNAL MEDICINE

## 2020-10-14 PROCEDURE — 80048 BASIC METABOLIC PNL TOTAL CA: CPT | Performed by: INTERNAL MEDICINE

## 2020-10-14 PROCEDURE — B5181ZA FLUOROSCOPY OF SUPERIOR VENA CAVA USING LOW OSMOLAR CONTRAST, GUIDANCE: ICD-10-PCS | Performed by: INTERNAL MEDICINE

## 2020-10-14 PROCEDURE — 97116 GAIT TRAINING THERAPY: CPT

## 2020-10-14 PROCEDURE — 02HV33Z INSERTION OF INFUSION DEVICE INTO SUPERIOR VENA CAVA, PERCUTANEOUS APPROACH: ICD-10-PCS | Performed by: INTERNAL MEDICINE

## 2020-10-14 PROCEDURE — 90471 IMMUNIZATION ADMIN: CPT

## 2020-10-14 NOTE — PROGRESS NOTES
[facility] AND [facility] Infectious Disease  Progress Note    Brittany Hogan Patient Status:  Inpatient    10/2/1950 MRN [id number]   Location [facility] 5SW/SE Attending Rod Davis MD   Hosp Day # 6 PCP PHYSICIAN NONSTAFF     Subjective: as above     3.  Leukocytosis due to the above  - At [facility] today and improving     4.  High fevers due to the above  - Resolved     5.  Disposition - stable for d/c from ID.  Greatest concern at this time would be for infection involving his knew gortex graft

## 2020-10-14 NOTE — CM/SW NOTE
Cm contacted Tanvir Pacheco at [address] [facility]. To finalize dc plan. Per Tanvir Pacheco ref was rec'd and pharmacy has entered the script. They do not need additional documents. Per Michelle Sanders does not have appt tomorrow.  Pt scheduled at [facility] in [facility] 10/

## 2020-10-14 NOTE — PROGRESS NOTES
Discharge RN Summary: Patient has discharge order in. Patient to discharge home via [address] [employer]. IV removed by this RN. PICC line intact. Explained to patient about PICC line care. Understands to follow up with PCP in 1 week.  Patient understands infusion liv

## 2020-10-14 NOTE — PROGRESS NOTES
Patient is doing well. He is on IV antibiotics and is awaiting placement of the PICC line for 6 weeks of IV antibiotics. This will be followed by suppressive antibiotics.  The right posterior knee incision is healing nicely with granulation tissue presen

## 2020-10-14 NOTE — PROGRESS NOTES
Patient requesting medicar for discharge with lift assist at home.  Aware and agreeing to charge of 51.00     time for 2 pm per Gianluca sigala     [address] [phone]      PCS form in the chart  [facility] [phone]

## 2020-10-14 NOTE — CM/SW NOTE
PATRICIO informed that pt has a d/c order in for today. d/c Rx on chart for ceftriaxone 2gm IV q24h until 11/21/2020. PATRICIO spoke to [place] @ [facility] to obtain first appointment in the [facility]. Per [place] they do not have referral or RX.  ALEC Wallace to re submit r

## 2020-10-14 NOTE — DISCHARGE SUMMARY
General Medicine Discharge Summary     Patient ID:  Elijah Henry  79year old  10/2/1950    Admit date: 10/8/2020    Discharge date and time: No discharge date for patient encounter.  10/14/20    Attending Physician: Geovanny Patricio MD     Consults: Mr. 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 Read the directions carefully, and ask your doctor or other care provider to review them with you.             STOP taking these medications    cephALEXin 500 MG Cap

## 2020-10-14 NOTE — PHYSICAL THERAPY NOTE
PHYSICAL THERAPY TREATMENT NOTE - INPATIENT     Room Number: 570/570-A       Presenting Problem: Sepsis    Problem List  Principal Problem:    Sepsis due to undetermined organism [facility])  Active Problems:    Acute cystitis with hematuria      PHYSICAL THERAP [address]                         O2 WALK                  AM-PAC '6-Clicks' INPATIENT SHORT FORM - BASIC MOBILITY  How much difficulty does the patient currently have. ..  -   Turning over in bed (including adjusting bedclothes with assist device: walker - rolling at assistance level: modified independent   Goal #3   Current Status  SBA RW 50'    Goal #4     Goal #4   Current Status     Goal #5 Patient to demonstrate independence with home activity/exercise instructions provided

## 2020-10-15 NOTE — PAYOR COMM NOTE
--------------  DISCHARGE REVIEW    Payor: Nora Szymanski  #:  [id number]  Authorization Number: [id number]    Admit date: 10/8/20  Admit time:  1559  Discharge Date: 10/14/2020  3:02 PM     Admitting Physician: Sarita Armando MD  Attending Physic Mr. 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 * hydrocortisone 20 MG Tabs  Commonly known as: CORTEF  Take 1 tablet (20 mg total) by mouth daily.      tamsulosin [facility] [address], [address]  Phone: [phone]  Fax: [phone]    Resume 5mg warfarin tonight 10/14/20. Follow up at Warfarin clinic tomorrow (10/15/20) to adjust warfarin dose.      Patient had opportunity to ask questions and state understand and agree

## 2021-03-01 ENCOUNTER — LAB REQUISITION (OUTPATIENT)
Dept: LAB | Facility: HOSPITAL | Age: 71
End: 2021-03-01
Payer: MEDICARE

## 2021-03-01 DIAGNOSIS — R31.9 HEMATURIA, UNSPECIFIED: ICD-10-CM

## 2021-03-01 DIAGNOSIS — E78.5 HYPERLIPIDEMIA, UNSPECIFIED: ICD-10-CM

## 2021-03-01 DIAGNOSIS — E66.1 DRUG-INDUCED OBESITY: ICD-10-CM

## 2021-03-01 LAB
ALBUMIN SERPL-MCNC: 4.4 G/DL (ref 3.4–5)
ALBUMIN/GLOB SERPL: 1.4 {RATIO} (ref 1–2)
ALP LIVER SERPL-CCNC: 72 U/L
ALT SERPL-CCNC: 20 U/L
ANION GAP SERPL CALC-SCNC: 7 MMOL/L (ref 0–18)
AST SERPL-CCNC: 15 U/L (ref 15–37)
BILIRUB SERPL-MCNC: 0.4 MG/DL (ref 0.1–2)
BILIRUB UR QL: NEGATIVE
BUN BLD-MCNC: 18 MG/DL (ref 7–18)
BUN/CREAT SERPL: 16.7 (ref 10–20)
CALCIUM BLD-MCNC: 9.9 MG/DL (ref 8.5–10.1)
CHLORIDE SERPL-SCNC: 109 MMOL/L (ref 98–112)
CHOLEST SMN-MCNC: 148 MG/DL (ref ?–200)
CLARITY UR: CLEAR
CO2 SERPL-SCNC: 25 MMOL/L (ref 21–32)
COLOR UR: YELLOW
CREAT BLD-MCNC: 1.08 MG/DL
EST. AVERAGE GLUCOSE BLD GHB EST-MCNC: 134 MG/DL (ref 68–126)
GLOBULIN PLAS-MCNC: 3.2 G/DL (ref 2.8–4.4)
GLUCOSE BLD-MCNC: 99 MG/DL (ref 70–99)
GLUCOSE UR-MCNC: NEGATIVE MG/DL
HBA1C MFR BLD HPLC: 6.3 % (ref ?–5.7)
HDLC SERPL-MCNC: 38 MG/DL (ref 40–59)
HGB UR QL STRIP.AUTO: NEGATIVE
KETONES UR-MCNC: NEGATIVE MG/DL
LDLC SERPL CALC-MCNC: 73 MG/DL (ref ?–100)
M PROTEIN MFR SERPL ELPH: 7.6 G/DL (ref 6.4–8.2)
NITRITE UR QL STRIP.AUTO: NEGATIVE
NONHDLC SERPL-MCNC: 110 MG/DL (ref ?–130)
OSMOLALITY SERPL CALC.SUM OF ELEC: 294 MOSM/KG (ref 275–295)
PH UR: 5 [PH] (ref 5–8)
POTASSIUM SERPL-SCNC: 4.3 MMOL/L (ref 3.5–5.1)
PROT UR-MCNC: NEGATIVE MG/DL
SODIUM SERPL-SCNC: 141 MMOL/L (ref 136–145)
SP GR UR STRIP: 1.01 (ref 1–1.03)
TRIGL SERPL-MCNC: 186 MG/DL (ref 30–149)
TSI SER-ACNC: 2.32 MIU/ML (ref 0.36–3.74)
UROBILINOGEN UR STRIP-ACNC: <2
VLDLC SERPL CALC-MCNC: 37 MG/DL (ref 0–30)

## 2021-03-01 PROCEDURE — 80061 LIPID PANEL: CPT | Performed by: STUDENT IN AN ORGANIZED HEALTH CARE EDUCATION/TRAINING PROGRAM

## 2021-03-01 PROCEDURE — 81001 URINALYSIS AUTO W/SCOPE: CPT | Performed by: STUDENT IN AN ORGANIZED HEALTH CARE EDUCATION/TRAINING PROGRAM

## 2021-03-01 PROCEDURE — 80053 COMPREHEN METABOLIC PANEL: CPT | Performed by: STUDENT IN AN ORGANIZED HEALTH CARE EDUCATION/TRAINING PROGRAM

## 2021-03-01 PROCEDURE — 84443 ASSAY THYROID STIM HORMONE: CPT | Performed by: STUDENT IN AN ORGANIZED HEALTH CARE EDUCATION/TRAINING PROGRAM

## 2021-03-01 PROCEDURE — 83036 HEMOGLOBIN GLYCOSYLATED A1C: CPT | Performed by: STUDENT IN AN ORGANIZED HEALTH CARE EDUCATION/TRAINING PROGRAM

## 2022-12-14 NOTE — PROGRESS NOTES
Vascular Surgery Progress Note    Pt seen and examined this am. Pt feeling much better this am with fluid resuscitation. On low dose pressor and improving. Pt with blood cultures with GPCs. Now on vanc and zosyn. Afebrile currently.  On exam, feet warm and 2010 (hysterectomy)

## 2023-06-09 ENCOUNTER — HOSPITAL ENCOUNTER (OUTPATIENT)
Facility: HOSPITAL | Age: 73
Setting detail: HOSPITAL OUTPATIENT SURGERY
Discharge: HOME | End: 2023-06-09
Attending: INTERNAL MEDICINE | Admitting: INTERNAL MEDICINE
Payer: MEDICARE

## 2023-06-09 ENCOUNTER — ANESTHESIA (OUTPATIENT)
Dept: ENDOSCOPY | Facility: HOSPITAL | Age: 73
End: 2023-06-09
Payer: MEDICARE

## 2023-06-09 ENCOUNTER — ANESTHESIA EVENT (OUTPATIENT)
Dept: ENDOSCOPY | Facility: HOSPITAL | Age: 73
End: 2023-06-09
Payer: MEDICARE

## 2023-06-09 VITALS
RESPIRATION RATE: 15 BRPM | DIASTOLIC BLOOD PRESSURE: 87 MMHG | OXYGEN SATURATION: 98 % | WEIGHT: 230 LBS | TEMPERATURE: 98 F | BODY MASS INDEX: 29.52 KG/M2 | HEIGHT: 74 IN | HEART RATE: 61 BPM | SYSTOLIC BLOOD PRESSURE: 122 MMHG

## 2023-06-09 DIAGNOSIS — Z86.010 PERSONAL HISTORY OF COLONIC POLYPS: ICD-10-CM

## 2023-06-09 LAB
GLUCOSE BLDC GLUCOMTR-MCNC: 121 MG/DL (ref 70–99)
INR BLD: 1.4 (ref 0.85–1.16)
PROTHROMBIN TIME: 17 SECONDS (ref 11.6–14.8)

## 2023-06-09 PROCEDURE — 85610 PROTHROMBIN TIME: CPT | Performed by: INTERNAL MEDICINE

## 2023-06-09 PROCEDURE — 88305 TISSUE EXAM BY PATHOLOGIST: CPT | Performed by: INTERNAL MEDICINE

## 2023-06-09 PROCEDURE — 82962 GLUCOSE BLOOD TEST: CPT

## 2023-06-09 PROCEDURE — 0DBH8ZX EXCISION OF CECUM, VIA NATURAL OR ARTIFICIAL OPENING ENDOSCOPIC, DIAGNOSTIC: ICD-10-PCS | Performed by: INTERNAL MEDICINE

## 2023-06-09 PROCEDURE — 0DBK8ZX EXCISION OF ASCENDING COLON, VIA NATURAL OR ARTIFICIAL OPENING ENDOSCOPIC, DIAGNOSTIC: ICD-10-PCS | Performed by: INTERNAL MEDICINE

## 2023-06-09 PROCEDURE — 0DBM8ZX EXCISION OF DESCENDING COLON, VIA NATURAL OR ARTIFICIAL OPENING ENDOSCOPIC, DIAGNOSTIC: ICD-10-PCS | Performed by: INTERNAL MEDICINE

## 2023-06-09 PROCEDURE — 0DBE8ZX EXCISION OF LARGE INTESTINE, VIA NATURAL OR ARTIFICIAL OPENING ENDOSCOPIC, DIAGNOSTIC: ICD-10-PCS | Performed by: INTERNAL MEDICINE

## 2023-06-09 RX ORDER — NICOTINE POLACRILEX 4 MG
30 LOZENGE BUCCAL
Status: DISCONTINUED | OUTPATIENT
Start: 2023-06-09 | End: 2023-06-09

## 2023-06-09 RX ORDER — ONDANSETRON 2 MG/ML
4 INJECTION INTRAMUSCULAR; INTRAVENOUS ONCE AS NEEDED
Status: DISCONTINUED | OUTPATIENT
Start: 2023-06-09 | End: 2023-06-09

## 2023-06-09 RX ORDER — SODIUM CHLORIDE, SODIUM LACTATE, POTASSIUM CHLORIDE, CALCIUM CHLORIDE 600; 310; 30; 20 MG/100ML; MG/100ML; MG/100ML; MG/100ML
INJECTION, SOLUTION INTRAVENOUS CONTINUOUS
Status: DISCONTINUED | OUTPATIENT
Start: 2023-06-09 | End: 2023-06-09

## 2023-06-09 RX ORDER — DEXTROSE MONOHYDRATE 25 G/50ML
50 INJECTION, SOLUTION INTRAVENOUS
Status: DISCONTINUED | OUTPATIENT
Start: 2023-06-09 | End: 2023-06-09

## 2023-06-09 RX ORDER — NICOTINE POLACRILEX 4 MG
15 LOZENGE BUCCAL
Status: DISCONTINUED | OUTPATIENT
Start: 2023-06-09 | End: 2023-06-09

## 2023-06-09 RX ORDER — METFORMIN HYDROCHLORIDE 500 MG/1
500 TABLET, EXTENDED RELEASE ORAL
Qty: 30 TABLET | Refills: 5 | COMMUNITY
Start: 2023-02-09 | End: 2023-08-08

## 2023-06-09 RX ORDER — NALOXONE HYDROCHLORIDE 0.4 MG/ML
80 INJECTION, SOLUTION INTRAMUSCULAR; INTRAVENOUS; SUBCUTANEOUS AS NEEDED
Status: DISCONTINUED | OUTPATIENT
Start: 2023-06-09 | End: 2023-06-09

## 2023-06-09 RX ORDER — LIDOCAINE HYDROCHLORIDE 10 MG/ML
INJECTION, SOLUTION EPIDURAL; INFILTRATION; INTRACAUDAL; PERINEURAL AS NEEDED
Status: DISCONTINUED | OUTPATIENT
Start: 2023-06-09 | End: 2023-06-09 | Stop reason: SURG

## 2023-06-09 RX ADMIN — LIDOCAINE HYDROCHLORIDE 50 MG: 10 INJECTION, SOLUTION EPIDURAL; INFILTRATION; INTRACAUDAL; PERINEURAL at 09:41:00

## 2023-06-09 RX ADMIN — SODIUM CHLORIDE, SODIUM LACTATE, POTASSIUM CHLORIDE, CALCIUM CHLORIDE: 600; 310; 30; 20 INJECTION, SOLUTION INTRAVENOUS at 10:32:00

## 2023-06-09 RX ADMIN — SODIUM CHLORIDE, SODIUM LACTATE, POTASSIUM CHLORIDE, CALCIUM CHLORIDE: 600; 310; 30; 20 INJECTION, SOLUTION INTRAVENOUS at 09:37:00

## 2023-06-09 NOTE — DISCHARGE INSTRUCTIONS
ENDOSCOPY DISCHARGE INSTRUCTIONS    Procedure Performed:   Colonoscopy    Endoscopist: Yu Reyes MD  FINDINGS:   Diverticulosis (pockets in colon that develop with age and lack of fiber intake) and Colon polyp(s) (a growth in the colon)    MEDICATIONS:  You may resume all other medications today - see below    DIET:  Regular - see below    BIOPSIES:  Biopsies were taken (you will be notified of results in 7-10 days)    X-RAYS:   No X-Rays were ordered today    Recommendations: 1. High Fiber diet:  30 grams of fiber a day (increase by 3 grams a week in the next 2-3 months) to decrease complications of diverticulosis  2. Changes to medications:  -Start your blood thinners tomorrow as you are high risk for bleeding  3. Repeat colonoscopy in 3 years pending biopsy results  4. Call GI clinic in 7-10 days if you do not hear from regarding your biopsy results    Activity for remainder of today:    REST TODAY  DO NOT drive or operate heavy machinery  DO NOT drink any alcoholic beverages  DO NOT sign any legal documents or make any important decisions    After your procedure(s): It is not unusual to feel bloated or gassy . Passing gas and belching is encouraged. Lying on your left side with your knees flexed may relieve the discomfort. A hot pack to the abdomen may also help. After your gastroscopy (upper endoscopy): You may experience a slight sore throat which will subside. Throat lozenges or salt water gargle can be used. FOLLOW-UP:  Contact the office at [phone] for follow-up appointment is needed or if you develop any of the following:    Severe abdominal pain/discomfort     Excessive bleeding                     Black tarry stool    Difficulty breathing/swallowing      Persistent nausea/vomiting  Fever above 100 degrees or chills        Home Care Instructions for Colonoscopy with Sedation    Diet:  - Resume your regular diet as tolerated unless otherwise instructed.   - Start with light meals to minimize bloating.  - Do not drink alcohol today. Medication:  - If you have questions about resuming your normal medications, please contact your Primary Care Physician. Activities:  - Take it easy today. Do not return to work today. - Do not drive today. - Do not operate any machinery today (including kitchen equipment). Colonoscopy:  - You may notice some rectal \"spotting\" (a little blood on the toilet tissue) for a day or two after the exam. This is normal.  - If you experience any rectal bleeding (not spotting), persistent tenderness or sharp severe abdominal pains, oral temperature over 100 degrees Fahrenheit, light-headedness or dizziness, or any other problems, contact your doctor. **If unable to reach your doctor, please go to the [facility] Emergency Room**    - Your referring physician will receive a full report of your examination.  - If you do not hear from your doctor's office within two weeks of your biopsy, please call them for your results. You may be able to see your laboratory results in [facility] between 4 and 7 business days. In some cases, your physician may not have viewed the results before they are released to [address] If you have questions regarding your results contact the physician who ordered the test/exam by phone or via [address] by choosing \"Ask a Medical Question. \"

## 2023-06-09 NOTE — OPERATIVE REPORT
Colonoscopy Operative Report    Satinder Patient Status:  [facility] Outpatient Surgery    10/2/1950 MRN [id number]   Location [facility] ENDOSCOPY LAB SUITES Attending Robert Tierney MD   Hosp Day #   0 PCP Ramya Kenyon DO     Pre-Operative Diagnosis: Personal history of colonic polyps    Post-Operative Diagnosis: 1. Terminal Ileum:  Normal upto 15 cm past ICV  2. Colon   -colon polyps x7:  Cecal x2, ascending x2, descending x3 - small sessile cold snare   -diverticulosis    Procedure Performed: COLONOSCOPY with cold snare    Informed Consent: Informed consent for both the procedure and sedation were obtained from the patient. The potentially life-threatening complications of sedation, bleeding,  perforation, transfusion or repeat endoscopy  were reviewed along with the possible need for hospitalization, surgical management, transfusion or repeat endoscopy should one of these complications arise. The patient understands and is agreeable to proceed. Sedation Type: MAC-Patient received sedation with monitored anesthesia provided by an anesthesiologist  Cecum Withdrawal Time:  33 minutes - increased peristalsis and need for aggressive irrigations/cleanign  Date of previous colonoscopy: > 10 years    Procedure Description: The patient was placed in the supine position. After careful digital examination of colostomy, the Pediatric colonoscope was inserted into the descending colon and advanced to the level of the cecum and ileum under direct visualization. The cecum was identified by landmarks, including the appendiceal orifice and ileoceccal valve. Careful examination of the entire colon was performed during withdrawal of the endoscope. The scope was withdrawn to the colostomy site and removed. The patient tolerated the procedure well with no immediate complications. The patient was transferred to the recovery area in stable condition.   Quality of Preparation: Adequate  Aronchick Bowel Prep Scale: 2/2/2    Findings: See above (post-operative diagnosis)  Recommendations: 1. High Fiber diet:  30 grams of fiber a day (increase by 3 grams a week in the next 2-3 months) to decrease complications of diverticulosis  2. Changes to medications:  -Start your blood thinners tomorrow as you are high risk for bleeding  3. Repeat colonoscopy in 3 years pending biopsy results  4. Call GI clinic in 7-10 days if you do not hear from regarding your biopsy results  Discharge: The patient was given an after visit summary detailing the procedure, findings, recommendations and follow up plans.   Yasir Segovia MD  6/9/2023  10:27 AM

## 2023-06-09 NOTE — PACU NOTE
Informed by PUSHPA Rao (break relief CRNA) that patient woke up thrashing and required suction to remove phlegm due to coughing. and pulled out a tooth. Went to see patient with Dr. Joao Ramirez at 0979, pt reports the lower middle incisor had previously been loose, also that he has woken up thrashing from anesthesia in the past. Reviewed preop informed consent discussion with patient that dental damage is a risk of anesthetic care. It is possible that pt gritted his teeth or bit down on suction yankaur tip while waking and this further loosened tooth. Patient already has a dental appointment scheduled for next week. Mouth examined when pt opened it to show where tooth came out, no bleeding. Surrounding teeth and appear discolored/darkened.

## 2023-06-09 NOTE — ANESTHESIA POSTPROCEDURE EVALUATION
Patient: Valentín Cook    Procedure Summary     Date: 06/09/23 Room / Location: [facility] ENDOSCOPY 01 / [facility] ENDOSCOPY    Anesthesia Start: 5283 Anesthesia Stop: 8358    Procedure: COLONOSCOPY  with polypectomy Diagnosis:       Personal history of colonic polyps      (diverticulosis, polyps)    Surgeons: Ole Denver, MD Anesthesiologist:     Anesthesia Type: general, MAC ASA Status: 2          Anesthesia Type: general, MAC    Vitals Value Taken Time   /54 06/09/23 1032   Temp 97 06/09/23 1032   Pulse 57 06/09/23 1032   Resp 14 06/09/23 1032   SpO2 96 06/09/23 1032       [facility] AN Post Evaluation:   Patient Evaluated in PACU  Patient Participation: complete - patient participated  Level of Consciousness: awake and awake and alert  Pain Score: 0  Pain Management: adequate  Airway Patency:patent  Dental exam unchanged from preop  Yes    Cardiovascular Status: acceptable  Respiratory Status: acceptable  Postoperative Hydration acceptable  Comments: When patient back in room and awake he pulled out front left bottom tooth. Teeth in poor condition.        Quirino Guerra, CRNA  6/9/2023 10:32 AM

## 2023-08-14 ENCOUNTER — LAB REQUISITION (OUTPATIENT)
Dept: LAB | Facility: HOSPITAL | Age: 73
End: 2023-08-14
Payer: MEDICARE

## 2023-08-14 DIAGNOSIS — L02.211 CUTANEOUS ABSCESS OF ABDOMINAL WALL: ICD-10-CM

## 2023-08-14 LAB
ALBUMIN SERPL-MCNC: 3.2 G/DL (ref 3.4–5)
ALBUMIN/GLOB SERPL: 0.8 {RATIO} (ref 1–2)
ALP LIVER SERPL-CCNC: 84 U/L
ALT SERPL-CCNC: 53 U/L
ANION GAP SERPL CALC-SCNC: 7 MMOL/L (ref 0–18)
AST SERPL-CCNC: 28 U/L (ref 15–37)
BASOPHILS # BLD AUTO: 0.11 X10(3) UL (ref 0–0.2)
BASOPHILS NFR BLD AUTO: 0.7 %
BILIRUB SERPL-MCNC: 0.5 MG/DL (ref 0.1–2)
BUN BLD-MCNC: 20 MG/DL (ref 7–18)
BUN/CREAT SERPL: 18 (ref 10–20)
CALCIUM BLD-MCNC: 9.5 MG/DL (ref 8.5–10.1)
CHLORIDE SERPL-SCNC: 109 MMOL/L (ref 98–112)
CO2 SERPL-SCNC: 26 MMOL/L (ref 21–32)
CREAT BLD-MCNC: 1.11 MG/DL
CRP SERPL-MCNC: 1.07 MG/DL (ref ?–0.3)
DEPRECATED RDW RBC AUTO: 49.1 FL (ref 35.1–46.3)
EGFRCR SERPLBLD CKD-EPI 2021: 71 ML/MIN/1.73M2 (ref 60–?)
EOSINOPHIL # BLD AUTO: 1.36 X10(3) UL (ref 0–0.7)
EOSINOPHIL NFR BLD AUTO: 9.3 %
ERYTHROCYTE [DISTWIDTH] IN BLOOD BY AUTOMATED COUNT: 14.6 % (ref 11–15)
GLOBULIN PLAS-MCNC: 3.8 G/DL (ref 2.8–4.4)
GLUCOSE BLD-MCNC: 93 MG/DL (ref 70–99)
HCT VFR BLD AUTO: 41.4 %
HGB BLD-MCNC: 13.6 G/DL
IMM GRANULOCYTES # BLD AUTO: 0.05 X10(3) UL (ref 0–1)
IMM GRANULOCYTES NFR BLD: 0.3 %
LYMPHOCYTES # BLD AUTO: 2.92 X10(3) UL (ref 1–4)
LYMPHOCYTES NFR BLD AUTO: 19.9 %
MCH RBC QN AUTO: 30.6 PG (ref 26–34)
MCHC RBC AUTO-ENTMCNC: 32.9 G/DL (ref 31–37)
MCV RBC AUTO: 93.2 FL
MONOCYTES # BLD AUTO: 0.81 X10(3) UL (ref 0.1–1)
MONOCYTES NFR BLD AUTO: 5.5 %
NEUTROPHILS # BLD AUTO: 9.42 X10 (3) UL (ref 1.5–7.7)
NEUTROPHILS # BLD AUTO: 9.42 X10(3) UL (ref 1.5–7.7)
NEUTROPHILS NFR BLD AUTO: 64.3 %
OSMOLALITY SERPL CALC.SUM OF ELEC: 296 MOSM/KG (ref 275–295)
PLATELET # BLD AUTO: 322 10(3)UL (ref 150–450)
POTASSIUM SERPL-SCNC: 4.3 MMOL/L (ref 3.5–5.1)
PROT SERPL-MCNC: 7 G/DL (ref 6.4–8.2)
RBC # BLD AUTO: 4.44 X10(6)UL
SODIUM SERPL-SCNC: 142 MMOL/L (ref 136–145)
WBC # BLD AUTO: 14.7 X10(3) UL (ref 4–11)

## 2023-08-14 PROCEDURE — 85060 BLOOD SMEAR INTERPRETATION: CPT | Performed by: INTERNAL MEDICINE

## 2023-08-14 PROCEDURE — 85025 COMPLETE CBC W/AUTO DIFF WBC: CPT | Performed by: INTERNAL MEDICINE

## 2023-08-14 PROCEDURE — 86140 C-REACTIVE PROTEIN: CPT | Performed by: INTERNAL MEDICINE

## 2023-08-14 PROCEDURE — 80053 COMPREHEN METABOLIC PANEL: CPT | Performed by: INTERNAL MEDICINE

## 2024-07-24 ENCOUNTER — OFFICE VISIT (OUTPATIENT)
Dept: AUDIOLOGY | Facility: CLINIC | Age: 74
End: 2024-07-24

## 2024-07-24 DIAGNOSIS — H90.3 SENSORINEURAL HEARING LOSS, BILATERAL: Primary | ICD-10-CM

## 2024-07-24 PROCEDURE — 92567 TYMPANOMETRY: CPT | Performed by: AUDIOLOGIST

## 2024-07-24 PROCEDURE — 92557 COMPREHENSIVE HEARING TEST: CPT | Performed by: AUDIOLOGIST

## 2024-10-13 NOTE — OCCUPATIONAL THERAPY NOTE
Attempted to see Pt for OT eval, nurse Kerri Taveras authorized Pt participation, PPE worn by therapist: gloves and mask. Pt was in bed, he reported pain at 3/10, he declined to get up.  Pt stated that he lives in [facility], she will be home to assist prn, h Initial (On Arrival)

## (undated) DEVICE — DRAIN SILICONE FLAT 10MM

## (undated) DEVICE — LONE STAR SMALL RING RETRACTOR

## (undated) DEVICE — SUTURE PROLENE 5-0 C-1

## (undated) DEVICE — 3 ML SYRINGE LUER-LOCK TIP: Brand: MONOJECT

## (undated) DEVICE — TRAP SPEC REMOVAL ETRAP 15CM

## (undated) DEVICE — KIT CLEAN ENDOKIT 1.1OZ GOWNX2

## (undated) DEVICE — VIOLET BRAIDED (POLYGLACTIN 910), SYNTHETIC ABSORBABLE SUTURE: Brand: COATED VICRYL

## (undated) DEVICE — SOL  .9 1000ML BAG

## (undated) DEVICE — SUTURE VICRYL 3-0 SH

## (undated) DEVICE — FLOSEAL HEMOSTATIC MATRIX, 5ML: Brand: FLOSEAL HEMOSTATIC MATRIX

## (undated) DEVICE — SPONGE LAP 18X18 XRAY STRL

## (undated) DEVICE — SUCTION CANISTER, 3000CC,SAFELINER: Brand: DEROYAL

## (undated) DEVICE — SUTURE ETHILON 2-0 FS

## (undated) DEVICE — INTENDED TO BE USED TO OCCLUDE, RETRACT AND IDENTIFY ARTERIES, VEINS, TENDONS AND NERVES IN SURGICAL PROCEDURES: Brand: STERION®  VESSEL LOOP

## (undated) DEVICE — GAMMEX® PI HYBRID SIZE 8, STERILE POWDER-FREE SURGICAL GLOVE, POLYISOPRENE AND NEOPRENE BLEND: Brand: GAMMEX

## (undated) DEVICE — ENCORE® PERRY STYLE 42 PF SIZE 8, STERILE LATEX POWDER-FREE SURGICAL GLOVE: Brand: ENCORE

## (undated) DEVICE — SNARE ENDOSCOPIC 10MM ROUND

## (undated) DEVICE — ENCORE® PERRY STYLE 42 PF SIZE 8.5, STERILE LATEX POWDER-FREE SURGICAL GLOVE: Brand: ENCORE

## (undated) DEVICE — SUTURE SILK 3-0 SH

## (undated) DEVICE — KIT ENDO ORCAPOD 160/180/190

## (undated) DEVICE — HEMOCLIP HORIZON SM MULTI

## (undated) DEVICE — CV: Brand: MEDLINE INDUSTRIES, INC.

## (undated) DEVICE — LONE STAR 5MM HOOKS LRG STAYS

## (undated) DEVICE — HEMOCLIP MED 24 CLIP/CARTRIDGE

## (undated) DEVICE — DRAIN RESERVOIR RELIAVAC 100CC

## (undated) DEVICE — DRAPE SHEET LG

## (undated) DEVICE — Device: Brand: DUAL NARE NASAL CANNULAE FEMALE LUER CON 7FT O2 TUBE

## (undated) DEVICE — IMPERVIOUS STOCKINETTE: Brand: DEROYAL

## (undated) DEVICE — SUTURE GORTEX CV-6 TTC-13 30IN

## (undated) DEVICE — ARISTA AH ABSORBABLE HEMOSTATIC PARTICLES: Brand: ARISTA™ AH

## (undated) DEVICE — SHEET,DRAPE,70X100,STERILE: Brand: MEDLINE

## (undated) DEVICE — SOL  .9 1000ML BTL

## (undated) DEVICE — 3M™ IOBAN™ 2 ANTIMICROBIAL INCISE DRAPE 6650EZ: Brand: IOBAN™ 2

## (undated) DEVICE — Device

## (undated) DEVICE — SUTURE SILK 2-0 SA65H

## (undated) DEVICE — CONTAINER SPEC STR 4OZ GRY LID

## (undated) DEVICE — SUTURE PROLENE 7-0 BV-1

## (undated) DEVICE — PROXIMATE SKIN STAPLERS (35 WIDE) CONTAINS 35 STAINLESS STEEL STAPLES (FIXED HEAD): Brand: PROXIMATE

## (undated) DEVICE — 6 ML SYRINGE LUER-LOCK TIP: Brand: MONOJECT

## (undated) DEVICE — SUTURE PROLENE 6-0 C-1

## (undated) DEVICE — SUTURE SILK 4-0 SA63H

## (undated) DEVICE — 60 ML SYRINGE REGULAR TIP: Brand: MONOJECT

## (undated) DEVICE — SUTURE SILK 3-0 SA64H

## (undated) DEVICE — INDICATED FOR SURGICAL CLAMPING DURING CARDIOVASCULAR PERIPHERAL VASCULAR, AND GENERAL SURGERY.: Brand: SOFT/FIBRA® SPRING CLIP

## (undated) DEVICE — CHLORAPREP 26ML APPLICATOR

## (undated) DEVICE — REM POLYHESIVE ADULT PATIENT RETURN ELECTRODE: Brand: VALLEYLAB

## (undated) DEVICE — CLAMP INSERT: Brand: SOFT/TRACTION CLAMP INSERTS

## (undated) DEVICE — STERILE LATEX POWDER-FREE SURGICAL GLOVESWITH NITRILE COATING: Brand: PROTEXIS

## (undated) DEVICE — COVER SGL STRL LGHT HNDL BLU

## (undated) DEVICE — TOWEL OR WHITE STRL

## (undated) DEVICE — MEDI-VAC NON-CONDUCTIVE SUCTION TUBING 6MM X 1.8M (6FT.) L: Brand: CARDINAL HEALTH

## (undated) DEVICE — SUTURE VICRYL 2-0 CT-1

## (undated) DEVICE — TOWEL OR BLU 16X26 STRL

## (undated) NOTE — LETTER
[address] [place] [address], [place]  Authorization for Invasive Procedure                                                                                           I hereby authorize Max Leary MD, my physician and his/her assistants (if applicable), which may include medical students, residents, and/or fellows, to perform the following surgical operation/ procedure and administer such anesthesia as may be determined necessary by my physician: Operation/Procedure name (s) [facility]ONOSCOPY on [facility]   2. I recognize that during the surgical operation/procedure, unforeseen conditions may necessitate additional or different procedures than those listed above. I, therefore, further authorize and request that the above-named surgeon, assistants, or designees perform such procedures as are, in their judgment, necessary and desirable. 3.   My surgeon/physician has discussed prior to my surgery the potential benefits, risks and side effects of this procedure; the likelihood of achieving goals; and potential problems that might occur during recuperation. They also discussed reasonable alternatives to the procedure, including risks, benefits, and side effects related to the alternatives and risks related to not receiving this procedure. I have had all my questions answered and I acknowledge that no guarantee has been made as to the result that may be obtained. 4.   Should the need arise during my operation/procedure, which includes change of level of care prior to discharge, I also consent to the administration of blood and/or blood products. Further, I understand that despite careful testing and screening of blood or blood products by collecting agencies, I may still be subject to ill effects as a result of receiving a blood transfusion and/or blood products.   The following are some, but not all, of the potential risks that can occur: fever and allergic reactions, hemolytic reactions, transmission of diseases such as Hepatitis, AIDS and Cytomegalovirus (CMV) and fluid overload. In the event that I wish to have an autologous transfusion of my own blood, or a directed donor transfusion, I will discuss this with my physician. Check only if Refusing Blood or Blood Products  I understand refusal of blood or blood products as deemed necessary by my physician may have serious consequences to my condition to include possible death. I hereby assume responsibility for my refusal and release the hospital, its personnel, and my physicians from any responsibility for the consequences of my refusal.    o  Refuse   5. I authorize the use of any specimen, organs, tissues, body parts or foreign objects that may be removed from my body during the operation/procedure for diagnosis, research or teaching purposes and their subsequent disposal by hospital authorities. I also authorize the release of specimen test results and/or written reports to my treating physician on the hospital medical staff or other referring or consulting physicians involved in my care, at the discretion of the Pathologist or my treating physician. 6.   I consent to the photographing or videotaping of the operations or procedures to be performed, including appropriate portions of my body for medical, scientific, or educational purposes, provided my identity is not revealed by the pictures or by descriptive texts accompanying them. If the procedure has been photographed/videotaped, the surgeon will obtain the original picture, image, videotape or CD. The hospital will not be responsible for storage, release or maintenance of the picture, image, tape or CD.    7.   I consent to the presence of a  or observers in the operating room as deemed necessary by my physician or their designees.     8.   I recognize that in the event my procedure results in extended X-Ray/fluoroscopy time, I may develop a skin reaction. 9. If I have a Do Not Attempt Resuscitation (DNAR) order in place, that status will be suspended while in the operating room, procedural suite, and during the recovery period unless otherwise explicitly stated by me (or a person authorized to consent on my behalf). The surgeon or my attending physician will determine when the applicable recovery period ends for purposes of reinstating the DNAR order. 10. Patients having a sterilization procedure: I understand that if the procedure is successful the results will be permanent and it will therefore be impossible for me to inseminate, conceive, or bear children. I also understand that the procedure is intended to result in sterility, although the result has not been guaranteed. 11. I acknowledge that my physician has explained sedation/analgesia administration to me including the risk and benefits I consent to the administration of sedation/analgesia as may be necessary or desirable in the judgment of my physician. I CERTIFY THAT I HAVE READ AND FULLY UNDERSTAND THE ABOVE CONSENT TO OPERATION and/or OTHER PROCEDURE.     _________________________________________ _________________________________     ___________________________________  Signature of Patient     Signature of Responsible Person                   Printed Name of Responsible Person                              _________________________________________ ______________________________        ___________________________________  Signature of Witness         Date  Time         Relationship to Patient    STATEMENT OF PHYSICIAN My signature below affirms that prior to the time of the procedure; I have explained to the patient and/or his/her legal representative, the risks and benefits involved in the proposed treatment and any reasonable alternative to the proposed treatment.  I have also explained the risks and benefits involved in refusal of the proposed treatment and alternatives to the proposed treatment and have answered the patient's questions.  If I have a significant financial interest in a co-management agreement or a significant financial interest in any product or implant, or other significant relationship used in this procedure/surgery, I have disclosed this and had a discussion with my patient.     _______________________________________________________________ _____________________________  Reji Leonard Physician)                                                                                         (Date)                                   (Time)  Patient Name: Chavo Bender    : 10/2/1950   Printed: 2023      Medical Record #: [id number]                                              Page 1 of 1

## (undated) NOTE — LETTER
[id number] [facility]     I agree to have a Peripherally Inserted Central Catheter (PICC) placed in my arm.    1. The PICC insertion procedure, care, maintenance, risks, benefits, and complications have been explained to me b 7. The person performing this procedure has discussed the potential benefits, risks, and side effects of the PICC; the likelihood of achieving goals; and potential problems that might occur during recuperation.  They also discussed reasonable alternatives t

## (undated) NOTE — IP AVS SNAPSHOT
[facility]            (For Outpatient Use Only) Initial Admit Date: 10/8/2020   Inpt/Obs Admit Date: Inpt: 10/8/20 / Obs: N/A   Discharge Date:    Ana Paula Bae:  [de-identified]   MRN: [de-identified]   CSN: [id number]   CEID: [id number]        [facility] Subscriber Name:  Judith Ewing :    Subscriber ID:  Pt Rel to Subscriber:    Hospital Account Financial Class: Medicare Advantage    2020

## (undated) NOTE — IP AVS SNAPSHOT
[facility]            (For Outpatient Use Only) Initial Admit Date: 9/16/2020   Inpt/Obs Admit Date: Inpt: 9/16/20 / Obs: N/A   Discharge Date:    Huel Curling:  [de-identified]   MRN: [de-identified]   CSN: [id number]   CEID: [id number]        [facility] Subscriber Name:  Talia Anderson :    Subscriber ID:  Pt Rel to Subscriber:    Hospital Account Financial Class: Medicare Advantage    2020